# Patient Record
Sex: FEMALE | Race: WHITE | Employment: OTHER | URBAN - METROPOLITAN AREA
[De-identification: names, ages, dates, MRNs, and addresses within clinical notes are randomized per-mention and may not be internally consistent; named-entity substitution may affect disease eponyms.]

---

## 2020-02-25 ENCOUNTER — TRANSFERRED RECORDS (OUTPATIENT)
Dept: HEALTH INFORMATION MANAGEMENT | Facility: CLINIC | Age: 45
End: 2020-02-25

## 2020-03-04 ENCOUNTER — REFERRAL (OUTPATIENT)
Dept: TRANSPLANT | Facility: CLINIC | Age: 45
End: 2020-03-04

## 2020-03-05 NOTE — TELEPHONE ENCOUNTER
"March 5, 2020 3:38 PM -  [id number]:   I spoke with the [facility] who had initially faxed over the referral to transplant in error. I also called the referring clinic, [facility] and spoke with nurse Tracey. She said the intent was to refer patient first to Pulmonology here for a diagnosis/ possible lung biopsy and then as stated in Dr. Cali Simmons's office note which was included, possible referral to transplant if needed depending on diagnosis.  Records have been indexed to chart and also available in CE. Patient has been contacted by the referring provider's  Office (nurse Taylor) to explain this misunderstanding and [facility] pulmonary has contacted the patient regarding scheduling an appt.     March 5, 2020 10:09 AM -  [id number]:   Patient returned my call and when I brought up a lung transplant she informed me that this is the 1st time she has heard of a transplant. She said \" I am in Shock\". She stated she thought she was being referred to Pulmonology for a consult regarding her PH.  I asked her to call Dr. Cali Simmons's office for clarification and to call me back.   "

## 2020-03-06 DIAGNOSIS — J84.89 BOOP (BRONCHIOLITIS OBLITERANS WITH ORGANIZING PNEUMONIA) (H): Primary | ICD-10-CM

## 2020-03-06 NOTE — TELEPHONE ENCOUNTER
RECORDS RECEIVED FROM: Care everywhere    DATE RECEIVED: 3.10.20   NOTES STATUS DETAILS   OFFICE NOTE from referring provider Care Everywhere Per appt note Dr. Cali Simmons's    OFFICE NOTE from other specialist Care Everywhere [facility]- 2.4.20 Jorge   [facility] - 11.26.19 samira     DISCHARGE SUMMARY from hospital N/A    DISCHARGE REPORT from the ER N/A    MEDICATION LIST N/A    IMAGING  (NEED IMAGES AND REPORTS)     CT SCAN Care Everywhere [facility]- 2.4.20, 1/9/18,    CHEST XRAY (CXR) Care Everywhere [facility]- 1.13.18, 1.12.18, 1.11.18, 1/9/18,   (1/4/18- 1/9/18), 1/2/18     TESTS     PULMONARY FUNCTION TESTING (PFT) In process/CE 3.10.20- scheduled   [place]- 11.21.19       Action 3.6.20 sv    Action Taken Imaging request sent to [place] and [facility] for  [place]  PFT- 11.21.19    [facility]-   CXR- 1.13.18, 1.12.18, 1.11.18, 1/9/18,   (1/4/18- 1/9/18), 1/2/18  CT-  2/4/20, 1/9/18    Message sent to nurse to place PFT order         Action 3.9.20 sv    Action Taken Received CD from [facility] of images  received all images requested from [facility]

## 2020-03-10 ENCOUNTER — PRE VISIT (OUTPATIENT)
Dept: PULMONOLOGY | Facility: CLINIC | Age: 45
End: 2020-03-10

## 2020-03-10 ENCOUNTER — OFFICE VISIT (OUTPATIENT)
Dept: PULMONOLOGY | Facility: CLINIC | Age: 45
End: 2020-03-10
Attending: INTERNAL MEDICINE
Payer: COMMERCIAL

## 2020-03-10 VITALS
WEIGHT: 147 LBS | BODY MASS INDEX: 31.71 KG/M2 | OXYGEN SATURATION: 95 % | HEIGHT: 57 IN | RESPIRATION RATE: 18 BRPM | DIASTOLIC BLOOD PRESSURE: 64 MMHG | HEART RATE: 72 BPM | SYSTOLIC BLOOD PRESSURE: 100 MMHG

## 2020-03-10 DIAGNOSIS — J84.89 BOOP (BRONCHIOLITIS OBLITERANS WITH ORGANIZING PNEUMONIA) (H): ICD-10-CM

## 2020-03-10 DIAGNOSIS — R06.02 SHORTNESS OF BREATH: ICD-10-CM

## 2020-03-10 DIAGNOSIS — R06.02 SHORTNESS OF BREATH: Primary | ICD-10-CM

## 2020-03-10 LAB
IGG SERPL-MCNC: 1176 MG/DL (ref 610–1616)
IGG1 SER-MCNC: 754 MG/DL (ref 382–929)
IGG2 SER-MCNC: 400 MG/DL (ref 242–700)
IGG3 SER-MCNC: 58 MG/DL (ref 22–176)
IGG4 SER-MCNC: 19 MG/DL (ref 4–86)

## 2020-03-10 PROCEDURE — 36415 COLL VENOUS BLD VENIPUNCTURE: CPT | Performed by: INTERNAL MEDICINE

## 2020-03-10 PROCEDURE — 82787 IGG 1 2 3 OR 4 EACH: CPT | Performed by: INTERNAL MEDICINE

## 2020-03-10 PROCEDURE — G0463 HOSPITAL OUTPT CLINIC VISIT: HCPCS | Mod: ZF

## 2020-03-10 PROCEDURE — 82784 ASSAY IGA/IGD/IGG/IGM EACH: CPT | Performed by: INTERNAL MEDICINE

## 2020-03-10 RX ORDER — SENNOSIDES A AND B 8.6 MG/1
1 TABLET, FILM COATED ORAL DAILY PRN
COMMUNITY

## 2020-03-10 RX ORDER — METOPROLOL SUCCINATE 50 MG/1
50 TABLET, EXTENDED RELEASE ORAL
COMMUNITY

## 2020-03-10 RX ORDER — METHYLDOPA/HYDROCHLOROTHIAZIDE 250MG-25MG
TABLET ORAL DAILY PRN
COMMUNITY

## 2020-03-10 ASSESSMENT — PAIN SCALES - GENERAL: PAINLEVEL: NO PAIN (0)

## 2020-03-10 ASSESSMENT — MIFFLIN-ST. JEOR: SCORE: 1172.73

## 2020-03-10 NOTE — PROGRESS NOTES
Reason for Visit  Sherita Johnson is a 44 year old year old female who is being seen for Consult (New consult on Sherita and her lung issues)    Pulmonary HPI  45 YO referred for further evaluation of SOB and COPPOLA. States has had cough that has been intermittent since childhood, Spring and Fall were worse. Per outside notes, patient had reported being in hospital frequently during first two years.  Denies any other exacerbants. Was told by Mother that she may have asthma; no evaluation was done at that time.  Since childhood to 2018 had intermittent episodes of cough that required courses of antibiotics.  Denies being on prednisone during that time.  No SOB or COPPOLA. Was seen by PCP NP in 2016 for cough; pulse oximetry was low- hospitalized. Was found that finger pulse ox was low but ear probe was normal.  Started on albuterol, used as needed for cough.  No change in symptoms until January 2018; diagnosed with Influenza A, hospitalized, developed acute respiratory failure requiring intubation for 10 days, septic shock and PABLITO.  Discharged after being in hospital for ~ 2 weeks, not treated with steroids, discharged on 1L/min oxygen for one month.  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  Onset of SOB and COPPOLA in 7-19, no triggers or exposures.  Symptoms have been progressive since that time. COPPOLA after walking 100 feet, unable to walk a flight of stairs.  No change in cough- no increase.  Hospitalized in 10-19 for cough and desaturation, started on oxygen.  Subsequently had CT chest with and without PE protocol- no PE, presence of airtrapping.  Recent sleep study negative but previously had positive study but declined CPAP. ANCA negative, Rheumatologic work-up negative except for increased KAELYN- repeat was normal; plans for outpatient Rheumatology evaluation.  Lifelong non-smoker, + second hand smoke exposure.  No occupational or hobby exposure.  No prior PFT's except for in Fall 2019, none prior.   Had recent right and left heart catherization- left heart cath without significant CAD, RHC with mean pulmonary artery pressure of 38, preserved cardiac output.    The patient was seen and examined by Matt Sanchez MD           Current Outpatient Medications   Medication     albuterol (PROAIR RESPICLICK) 108 (90 Base) MCG/ACT inhaler     Cholecalciferol (VITAMIN D3) 50 MCG (2000 UT) CHEW     Echinacea 125 MG CAPS     Fexofenadine HCl (MUCINEX ALLERGY PO)     fluticasone-vilanterol (BREO ELLIPTA) 100-25 MCG/INH inhaler     Loratadine (CLARITIN PO)     metoprolol succinate ER (TOPROL-XL) 50 MG 24 hr tablet     MULTIPLE VITAMIN PO     senna (SENOKOT) 8.6 MG tablet     sertraline (ZOLOFT) 50 MG tablet     No current facility-administered medications for this visit.      Allergies   Allergen Reactions     Ciprofloxacin Anaphylaxis, Hives, Itching and Rash     Metronidazole Anaphylaxis, Hives, Itching and Rash     Penicillins      Other reaction(s): Headache, Unknown/Not Verified     Sulfa Drugs      No past medical history on file.    No past surgical history on file.    Social History     Socioeconomic History     Marital status: Single     Spouse name: Not on file     Number of children: Not on file     Years of education: Not on file     Highest education level: Not on file   Occupational History     Not on file   Social Needs     Financial resource strain: Not on file     Food insecurity     Worry: Not on file     Inability: Not on file     Transportation needs     Medical: Not on file     Non-medical: Not on file   Tobacco Use     Smoking status: Never Smoker     Smokeless tobacco: Never Used   Substance and Sexual Activity     Alcohol use: Not on file     Drug use: Not on file     Sexual activity: Not on file   Lifestyle     Physical activity     Days per week: Not on file     Minutes per session: Not on file     Stress: Not on file   Relationships     Social connections     Talks on phone: Not on file     Gets  "together: Not on file     Attends [employer] service: Not on file     Active member of club or organization: Not on file     Attends meetings of clubs or organizations: Not on file     Relationship status: Not on file     Intimate partner violence     Fear of current or ex partner: Not on file     Emotionally abused: Not on file     Physically abused: Not on file     Forced sexual activity: Not on file   Other Topics Concern     Not on file   Social History Narrative     Not on file       FH:  Mother-COPD, Brother- asthma, Sister- arrhythmia  ROS Pulmonary  A complete ROS was otherwise negative except as noted in the HPI.  /64   Pulse 72   Resp 18   Ht 1.435 m (4' 8.5\")   Wt 66.7 kg (147 lb)   SpO2 95%   BMI 32.38 kg/m    Exam:   GENERAL APPEARANCE: Well developed, well nourished, alert, and in no apparent distress.  EYES: PERRL, EOMI  HENT: Nasal mucosa with no edema and no hyperemia. No nasal polyps.  EARS: Canals clear, TMs normal  MOUTH: Oral mucosa is moist, without any lesions, no tonsillar enlargement, no oropharyngeal exudate.  NECK: supple, no masses, no thyromegaly.  LYMPHATICS: No significant axillary, cervical, or supraclavicular nodes.  RESP: Decreased air flow throughout.  No crackles. No rhonchi. No wheezes.  CV: Normal S1, S2, regular rhythm, normal rate. No murmur.  No rub. No gallop. No LE edema.   ABDOMEN:  Bowel sounds normal, soft, nontender, no HSM or masses.   MS: extremities normal. No clubbing. No cyanosis.  SKIN: no rash on limited exam  NEURO: Mentation intact, speech normal, normal strength and tone, normal gait and stance  PSYCH: mentation appears normal. and affect normal/bright  Results:  Full pulmonary function tests were personally reviewed in clinic  FVC 1.37 (50%), FEV-1 0.83 (37%), FEV-1/FVC 61%.  No significant change after bronchodilator  %, %, %  DLCO 90%  Moderate airflow limitation.  Overdistended lung volumes with air trapping.  Normal " diffusion capacity.  Recent Results (from the past 168 hour(s))   General PFT Lab (Please always keep checked)    Collection Time: 03/10/20  6:10 AM   Result Value Ref Range    FVC-Pred 2.68 L    FVC-Pre 1.37 L    FVC-%Pred-Pre 50 %    FEV1-Pre 0.83 L    FEV1-%Pred-Pre 37 %    FEV1FVC-Pred 82 %    FEV1FVC-Pre 61 %    FEFMax-Pred 5.77 L/sec    FEFMax-Pre 2.40 L/sec    FEFMax-%Pred-Pre 41 %    FEF2575-Pred 2.45 L/sec    FEF2575-Pre 0.47 L/sec    SCK0966-%Pred-Pre 19 %    FEF2575-Post 0.47 L/sec    CBH0848-%Pred-Post 19 %    ExpTime-Pre 7.78 sec    FIFMax-Pre 1.91 L/sec    VC-Pred 2.61 L    VC-Pre 1.42 L    VC-%Pred-Pre 54 %    IC-Pred 2.09 L    IC-Pre 1.30 L    IC-%Pred-Pre 61 %    ERV-Pred 0.52 L    ERV-Pre 0.12 L    ERV-%Pred-Pre 23 %    FEV1FEV6-Pred 83 %    FEV1FEV6-Pre 61 %    FRCPleth-Pred 2.26 L    FRCPleth-Pre 2.71 L    FRCPleth-%Pred-Pre 120 %    RVPleth-Pred 1.33 L    RVPleth-Pre 2.59 L    RVPleth-%Pred-Pre 194 %    TLCPleth-Pred 3.68 L    TLCPleth-Pre 4.01 L    TLCPleth-%Pred-Pre 108 %    DLCOunc-Pred 16.32 ml/min/mmHg    DLCOunc-Pre 14.72 ml/min/mmHg    DLCOunc-%Pred-Pre 90 %    VA-Pre 2.56 L    VA-%Pred-Pre 72 %    FEV1SVC-Pred 84 %    FEV1SVC-Pre 58 %       Assessment and plan:   43 YO with history of respiratory problems early in life with onset of symptoms since 2018 and particularly since Summer 2019.  Moderate obstructive disease on PFT's without smoking history and normal alpha one antitrypsin level; unclear etiology for obstructive lung disease.  Also with newly diagnosed pulmonary hypertension.  Requires low level oxygen support and past history of obstructive sleep apnea.  Cause of obstructive lung disease is not clear- unlikely related to influenza, ? Airway centric fibrosis.  May need lung biopsy to definitively identify cause of airflow obstruction     1. Will review outside records  2. Await images from outside CT  3. Check IgG and subclasses; particularly IgG4 levels  4. Further discussions  regarding management are dependent upon review of the above studies.    Patient to call with any questions or concerns.

## 2020-03-10 NOTE — NURSING NOTE
Chief Complaint   Patient presents with     Consult     New consult on Sherita and her lung issues     Paul Paulson, VIRGEN CMA at 6:56 AM on 3/10/2020

## 2020-03-10 NOTE — LETTER
3/10/2020       RE: Sherita Johnson  [address]  [address]  [place] [address]     Dear Colleague,    Thank you for referring your patient, Sherita Johnson, to the [facility] at [facility]. Please see a copy of my visit note below.    Reason for Visit  Sherita Johnson is a 44 year old year old female who is being seen for Consult (New consult on Sherita and her lung issues)    Pulmonary HPI  43 YO referred for further evaluation of SOB and COPPOLA. States has had cough that has been intermittent since childhood, Spring and Fall were worse. Per outside notes, patient had reported being in hospital frequently during first two years.  Denies any other exacerbants. Was told by Mother that she may have asthma; no evaluation was done at that time.  Since childhood to 2018 had intermittent episodes of cough that required courses of antibiotics.  Denies being on prednisone during that time.  No SOB or COPPOLA. Was seen by PCP NP in 2016 for cough; pulse oximetry was low- hospitalized. Was found that finger pulse ox was low but ear probe was normal.  Started on albuterol, used as needed for cough.  No change in symptoms until January 2018; diagnosed with Influenza A, hospitalized, developed acute respiratory failure requiring intubation for 10 days, septic shock and PABLITO.  Discharged after being in hospital for ~ 2 weeks, not treated with steroids, discharged on 1L/min oxygen for one month.  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  Onset of SOB and COPPOLA in 7-19, no triggers or exposures.  Symptoms have been progressive since that time. COPPOLA after walking 100 feet, unable to walk a flight of stairs.  No change in cough- no increase.  Hospitalized in 10-19 for cough and desaturation, started on oxygen.  Subsequently had CT chest with and without PE protocol- no PE, presence of airtrapping.  Recent sleep study negative but previously had positive  study but declined CPAP. ANCA negative, Rheumatologic work-up negative except for increased KAELYN- repeat was normal; plans for outpatient Rheumatology evaluation.  Lifelong non-smoker, + second hand smoke exposure.  No occupational or hobby exposure.  No prior PFT's except for in Fall 2019, none prior.  Had recent right and left heart catherization- left heart cath without significant CAD, RHC with mean pulmonary artery pressure of 38, preserved cardiac output.    The patient was seen and examined by Matt Sanchez MD           Current Outpatient Medications   Medication     albuterol (PROAIR RESPICLICK) 108 (90 Base) MCG/ACT inhaler     Cholecalciferol (VITAMIN D3) 50 MCG (2000 UT) CHEW     Echinacea 125 MG CAPS     Fexofenadine HCl (MUCINEX ALLERGY PO)     fluticasone-vilanterol (BREO ELLIPTA) 100-25 MCG/INH inhaler     Loratadine (CLARITIN PO)     metoprolol succinate ER (TOPROL-XL) 50 MG 24 hr tablet     MULTIPLE VITAMIN PO     senna (SENOKOT) 8.6 MG tablet     sertraline (ZOLOFT) 50 MG tablet     No current facility-administered medications for this visit.      Allergies   Allergen Reactions     Ciprofloxacin Anaphylaxis, Hives, Itching and Rash     Metronidazole Anaphylaxis, Hives, Itching and Rash     Penicillins      Other reaction(s): Headache, Unknown/Not Verified     Sulfa Drugs      No past medical history on file.    No past surgical history on file.    Social History     Socioeconomic History     Marital status: Single     Spouse name: Not on file     Number of children: Not on file     Years of education: Not on file     Highest education level: Not on file   Occupational History     Not on file   Social Needs     Financial resource strain: Not on file     Food insecurity     Worry: Not on file     Inability: Not on file     Transportation needs     Medical: Not on file     Non-medical: Not on file   Tobacco Use     Smoking status: Never Smoker     Smokeless tobacco: Never Used   Substance and  "Sexual Activity     Alcohol use: Not on file     Drug use: Not on file     Sexual activity: Not on file   Lifestyle     Physical activity     Days per week: Not on file     Minutes per session: Not on file     Stress: Not on file   Relationships     Social connections     Talks on phone: Not on file     Gets together: Not on file     Attends [employer] service: Not on file     Active member of club or organization: Not on file     Attends meetings of clubs or organizations: Not on file     Relationship status: Not on file     Intimate partner violence     Fear of current or ex partner: Not on file     Emotionally abused: Not on file     Physically abused: Not on file     Forced sexual activity: Not on file   Other Topics Concern     Not on file   Social History Narrative     Not on file       FH:  Mother-COPD, Brother- asthma, Sister- arrhythmia  ROS Pulmonary  A complete ROS was otherwise negative except as noted in the HPI.  /64   Pulse 72   Resp 18   Ht 1.435 m (4' 8.5\")   Wt 66.7 kg (147 lb)   SpO2 95%   BMI 32.38 kg/m    Exam:   GENERAL APPEARANCE: Well developed, well nourished, alert, and in no apparent distress.  EYES: PERRL, EOMI  HENT: Nasal mucosa with no edema and no hyperemia. No nasal polyps.  EARS: Canals clear, TMs normal  MOUTH: Oral mucosa is moist, without any lesions, no tonsillar enlargement, no oropharyngeal exudate.  NECK: supple, no masses, no thyromegaly.  LYMPHATICS: No significant axillary, cervical, or supraclavicular nodes.  RESP: Decreased air flow throughout.  No crackles. No rhonchi. No wheezes.  CV: Normal S1, S2, regular rhythm, normal rate. No murmur.  No rub. No gallop. No LE edema.   ABDOMEN:  Bowel sounds normal, soft, nontender, no HSM or masses.   MS: extremities normal. No clubbing. No cyanosis.  SKIN: no rash on limited exam  NEURO: Mentation intact, speech normal, normal strength and tone, normal gait and stance  PSYCH: mentation appears normal. and affect " normal/bright  Results:  Full pulmonary function tests were personally reviewed in clinic  FVC 1.37 (50%), FEV-1 0.83 (37%), FEV-1/FVC 61%.  No significant change after bronchodilator  %, %, %  DLCO 90%  Moderate airflow limitation.  Overdistended lung volumes with air trapping.  Normal diffusion capacity.  Recent Results (from the past 168 hour(s))   General PFT Lab (Please always keep checked)    Collection Time: 03/10/20  6:10 AM   Result Value Ref Range    FVC-Pred 2.68 L    FVC-Pre 1.37 L    FVC-%Pred-Pre 50 %    FEV1-Pre 0.83 L    FEV1-%Pred-Pre 37 %    FEV1FVC-Pred 82 %    FEV1FVC-Pre 61 %    FEFMax-Pred 5.77 L/sec    FEFMax-Pre 2.40 L/sec    FEFMax-%Pred-Pre 41 %    FEF2575-Pred 2.45 L/sec    FEF2575-Pre 0.47 L/sec    RXH0032-%Pred-Pre 19 %    FEF2575-Post 0.47 L/sec    TOJ8870-%Pred-Post 19 %    ExpTime-Pre 7.78 sec    FIFMax-Pre 1.91 L/sec    VC-Pred 2.61 L    VC-Pre 1.42 L    VC-%Pred-Pre 54 %    IC-Pred 2.09 L    IC-Pre 1.30 L    IC-%Pred-Pre 61 %    ERV-Pred 0.52 L    ERV-Pre 0.12 L    ERV-%Pred-Pre 23 %    FEV1FEV6-Pred 83 %    FEV1FEV6-Pre 61 %    FRCPleth-Pred 2.26 L    FRCPleth-Pre 2.71 L    FRCPleth-%Pred-Pre 120 %    RVPleth-Pred 1.33 L    RVPleth-Pre 2.59 L    RVPleth-%Pred-Pre 194 %    TLCPleth-Pred 3.68 L    TLCPleth-Pre 4.01 L    TLCPleth-%Pred-Pre 108 %    DLCOunc-Pred 16.32 ml/min/mmHg    DLCOunc-Pre 14.72 ml/min/mmHg    DLCOunc-%Pred-Pre 90 %    VA-Pre 2.56 L    VA-%Pred-Pre 72 %    FEV1SVC-Pred 84 %    FEV1SVC-Pre 58 %       Assessment and plan:   43 YO with history of respiratory problems early in life with onset of symptoms since 2018 and particularly since Summer 2019.  Moderate obstructive disease on PFT's without smoking history and normal alpha one antitrypsin level; unclear etiology for obstructive lung disease.  Also with newly diagnosed pulmonary hypertension.  Requires low level oxygen support and past history of obstructive sleep apnea.  Cause of obstructive  lung disease is not clear- unlikely related to influenza, ? Airway centric fibrosis.  May need lung biopsy to definitively identify cause of airflow obstruction     1. Will review outside records  2. Await images from outside CT  3. Check IgG and subclasses; particularly IgG4 levels  4. Further discussions regarding management are dependent upon review of the above studies.    Patient to call with any questions or concerns.          Again, thank you for allowing me to participate in the care of your patient.      Sincerely,    Matt Sanchez MD

## 2020-03-12 ENCOUNTER — HEALTH MAINTENANCE LETTER (OUTPATIENT)
Age: 45
End: 2020-03-12

## 2020-03-13 LAB
DLCOUNC-%PRED-PRE: 90 %
DLCOUNC-PRE: 14.72 ML/MIN/MMHG
DLCOUNC-PRED: 16.32 ML/MIN/MMHG
ERV-%PRED-PRE: 23 %
ERV-PRE: 0.12 L
ERV-PRED: 0.52 L
EXPTIME-PRE: 7.78 SEC
FEF2575-%PRED-POST: 19 %
FEF2575-%PRED-PRE: 19 %
FEF2575-POST: 0.47 L/SEC
FEF2575-PRE: 0.47 L/SEC
FEF2575-PRED: 2.45 L/SEC
FEFMAX-%PRED-PRE: 41 %
FEFMAX-PRE: 2.4 L/SEC
FEFMAX-PRED: 5.77 L/SEC
FEV1-%PRED-PRE: 37 %
FEV1-PRE: 0.83 L
FEV1FEV6-PRE: 61 %
FEV1FEV6-PRED: 83 %
FEV1FVC-PRE: 61 %
FEV1FVC-PRED: 82 %
FEV1SVC-PRE: 58 %
FEV1SVC-PRED: 84 %
FIFMAX-PRE: 1.91 L/SEC
FRCPLETH-%PRED-PRE: 120 %
FRCPLETH-PRE: 2.71 L
FRCPLETH-PRED: 2.26 L
FVC-%PRED-PRE: 50 %
FVC-PRE: 1.37 L
FVC-PRED: 2.68 L
IC-%PRED-PRE: 61 %
IC-PRE: 1.3 L
IC-PRED: 2.09 L
RVPLETH-%PRED-PRE: 194 %
RVPLETH-PRE: 2.59 L
RVPLETH-PRED: 1.33 L
TLCPLETH-%PRED-PRE: 108 %
TLCPLETH-PRE: 4.01 L
TLCPLETH-PRED: 3.68 L
VA-%PRED-PRE: 72 %
VA-PRE: 2.56 L
VC-%PRED-PRE: 54 %
VC-PRE: 1.42 L
VC-PRED: 2.61 L

## 2020-03-18 ENCOUNTER — TELEPHONE (OUTPATIENT)
Dept: PULMONOLOGY | Facility: CLINIC | Age: 45
End: 2020-03-18

## 2020-03-18 NOTE — TELEPHONE ENCOUNTER
"Contacted patient to advise Dr. Sanchez's message:    \" I have reviewed the outside records but I am not able to see the outside CT in [facility].  As I and her outside provider have discussed with her she may need a lung biopsy.  However with the hold on non-emergent surgical procedures we will need to further discuss after concerns regarding the pandemic resolve.  I will present her case at the ILD conference when able\".    Pt verbalized understanding and has no further questions at this time.   "

## 2020-03-26 NOTE — TELEPHONE ENCOUNTER
March 26, 2020 4:23 PM -  [id number]:   This referral was sent to transplant intake in error. Ok to close

## 2021-01-04 ENCOUNTER — HEALTH MAINTENANCE LETTER (OUTPATIENT)
Age: 46
End: 2021-01-04

## 2021-03-07 ENCOUNTER — HEALTH MAINTENANCE LETTER (OUTPATIENT)
Age: 46
End: 2021-03-07

## 2021-03-24 ENCOUNTER — TELEPHONE (OUTPATIENT)
Dept: PULMONOLOGY | Facility: CLINIC | Age: 46
End: 2021-03-24

## 2021-03-24 NOTE — TELEPHONE ENCOUNTER
Contacted by patient to state that she was to return for F/U apt with DR Sanchez. She would like to schedule virtual apt with Dr Sanchez. The clinic has yet to receive ct on CD that was requested. Dr Sanchez was to review and have ILD conference discuss. Left message with her clinic in [facility]() to see if they could push image to [facility]. If not will need image mailed to clinic. Left clinic number to respond to message.    Images pushed to [facility] and in system. Sent message to Dr Sanchez to review images and then could schedule virtual visit. Let patient know images here.

## 2021-04-25 ENCOUNTER — HEALTH MAINTENANCE LETTER (OUTPATIENT)
Age: 46
End: 2021-04-25

## 2021-07-06 ENCOUNTER — TELEPHONE (OUTPATIENT)
Dept: PULMONOLOGY | Facility: CLINIC | Age: 46
End: 2021-07-06

## 2021-07-06 NOTE — TELEPHONE ENCOUNTER
Patient called to get appointment scheduled. RN sent message to provider to see when he would like to see her again, as it is not specified in the progress note. RN will await response and send to Clinic Coordinators when there is an answer.

## 2021-07-07 ENCOUNTER — TELEPHONE (OUTPATIENT)
Dept: PULMONOLOGY | Facility: CLINIC | Age: 46
End: 2021-07-07

## 2021-07-12 ENCOUNTER — TELEPHONE (OUTPATIENT)
Dept: PULMONOLOGY | Facility: CLINIC | Age: 46
End: 2021-07-12

## 2021-07-12 NOTE — TELEPHONE ENCOUNTER
Pt called and LVM over the weekend with questions regarding her visit in August with Dr. Sanchez. Touched base with ANGIE Trinh, and spoke with pt as pt and cousin were wondering if pt would need a multi-day stay in August. Informed her this was more to touch base as we have not seen her in over a year. Appt details confirmed.

## 2021-08-17 ENCOUNTER — OFFICE VISIT (OUTPATIENT)
Dept: PULMONOLOGY | Facility: CLINIC | Age: 46
End: 2021-08-17
Attending: INTERNAL MEDICINE
Payer: COMMERCIAL

## 2021-08-17 VITALS — OXYGEN SATURATION: 99 % | SYSTOLIC BLOOD PRESSURE: 99 MMHG | HEART RATE: 67 BPM | DIASTOLIC BLOOD PRESSURE: 67 MMHG

## 2021-08-17 DIAGNOSIS — J41.0 SIMPLE CHRONIC BRONCHITIS (H): Primary | ICD-10-CM

## 2021-08-17 DIAGNOSIS — J41.0 SIMPLE CHRONIC BRONCHITIS (H): ICD-10-CM

## 2021-08-17 LAB
6 MIN WALK (FT): 670 FT
6 MIN WALK (M): 204 M

## 2021-08-17 PROCEDURE — 94618 PULMONARY STRESS TESTING: CPT

## 2021-08-17 PROCEDURE — 99213 OFFICE O/P EST LOW 20 MIN: CPT | Mod: 25 | Performed by: INTERNAL MEDICINE

## 2021-08-17 PROCEDURE — G0463 HOSPITAL OUTPT CLINIC VISIT: HCPCS

## 2021-08-17 RX ORDER — AMLODIPINE BESYLATE 5 MG/1
5 TABLET ORAL DAILY
COMMUNITY

## 2021-08-17 NOTE — PROGRESS NOTES
Reason for Visit  Sherita Johnson is a 46 year old year old female who is being seen for General Visit (follow up)    Pulmonary HPI  47 YO referred for further evaluation of SOB and COPPOLA. States has had cough that has been intermittent since childhood, Spring and Fall were worse. Per outside notes, patient had reported being in hospital frequently during first two years.  Denies any other exacerbants. Was told by Mother that she may have asthma; no evaluation was done at that time.  Since childhood to 2018 had intermittent episodes of cough that required courses of antibiotics.  Denies being on prednisone during that time.  No SOB or COPPOLA. Was seen by PCP NP in 2016 for cough; pulse oximetry was low- hospitalized. Was found that finger pulse ox was low but ear probe was normal.  Started on albuterol, used as needed for cough.  No change in symptoms until January 2018; diagnosed with Influenza A, hospitalized, developed acute respiratory failure requiring intubation for 10 days, septic shock and PABLITO.  Discharged after being in hospital for ~ 2 weeks, not treated with steroids, discharged on 1L/min oxygen for one month.  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  Onset of SOB and COPPOLA in 7-19, no triggers or exposures.  Symptoms have been progressive since that time. COPPOLA after walking 100 feet, unable to walk a flight of stairs.  No change in cough- no increase.  Hospitalized in 10-19 for cough and desaturation, started on oxygen.  Subsequently had CT chest with and without PE protocol- no PE, presence of airtrapping.  Recent sleep study negative but previously had positive study but declined CPAP. ANCA negative, Rheumatologic work-up negative except for increased KAELYN- repeat was normal; plans for outpatient Rheumatology evaluation.  Lifelong non-smoker, + second hand smoke exposure.  No occupational or hobby exposure.  No prior PFT's except for in Fall 2019, none prior.  Had recent right and left  heart catherization- left heart cath without significant CAD, RHC with mean pulmonary artery pressure of 38, preserved cardiac output.    Last seen 18 months ago. At her last visit, the IgG level and sub-types were measured- all levels were normal including IgG4.  Since her last visit she has been doing about the same; remains on 1L, still has SOB/COPPOLA after walking 0.5 block.  Is not involved in PT.  Was changed from Breo to Advair, thinks Breo was better.  Has not received COVID vaccination, is unsure if she wants to receive the vaccination.  She brought down a  CD of outside images for review.    The patient was seen and examined by Matt Sanchez MD           Current Outpatient Medications   Medication     albuterol (PROAIR RESPICLICK) 108 (90 Base) MCG/ACT inhaler     Cholecalciferol (VITAMIN D3) 50 MCG (2000 UT) CHEW     Echinacea 125 MG CAPS     Fexofenadine HCl (MUCINEX ALLERGY PO)     fluticasone-vilanterol (BREO ELLIPTA) 100-25 MCG/INH inhaler     Loratadine (CLARITIN PO)     metoprolol succinate ER (TOPROL-XL) 50 MG 24 hr tablet     MULTIPLE VITAMIN PO     senna (SENOKOT) 8.6 MG tablet     sertraline (ZOLOFT) 50 MG tablet     No current facility-administered medications for this visit.     Allergies   Allergen Reactions     Ciprofloxacin Anaphylaxis, Hives, Itching and Rash     Metronidazole Anaphylaxis, Hives, Itching and Rash     Penicillins      Other reaction(s): Headache, Unknown/Not Verified     Sulfa Drugs      No past medical history on file.    No past surgical history on file.    Social History     Socioeconomic History     Marital status: Single     Spouse name: Not on file     Number of children: Not on file     Years of education: Not on file     Highest education level: Not on file   Occupational History     Not on file   Tobacco Use     Smoking status: Never Smoker     Smokeless tobacco: Never Used   Substance and Sexual Activity     Alcohol use: Not on file     Drug use: Not on file      Sexual activity: Not on file   Other Topics Concern     Not on file   Social History Narrative     Not on file     Social Determinants of Health     Financial Resource Strain:      Difficulty of Paying Living Expenses:    Food Insecurity:      Worried About Running Out of Food in the Last Year:      Ran Out of Food in the Last Year:    Transportation Needs:      Lack of Transportation (Medical):      Lack of Transportation (Non-Medical):    Physical Activity:      Days of Exercise per Week:      Minutes of Exercise per Session:    Stress:      Feeling of Stress :    Social Connections:      Frequency of Communication with Friends and Family:      Frequency of Social Gatherings with Friends and Family:      Attends Orthodoxy Services:      Active Member of Clubs or Organizations:      Attends Club or Organization Meetings:      Marital Status:    Intimate Partner Violence:      Fear of Current or Ex-Partner:      Emotionally Abused:      Physically Abused:      Sexually Abused:        ROS Pulmonary  A complete ROS was otherwise negative except as noted in the HPI.  There were no vitals taken for this visit.  Exam:   GENERAL APPEARANCE: Well developed, well nourished, alert, and in no apparent distress.  EYES: PERRL, EOMI  HENT: Nasal mucosa with no edema and no hyperemia. No nasal polyps.  EARS: Canals clear, TMs normal  MOUTH: Oral mucosa is moist, without any lesions, no tonsillar enlargement, no oropharyngeal exudate.  NECK: supple, no masses, no thyromegaly.  LYMPHATICS: No significant axillary, cervical, or supraclavicular nodes.  RESP:  Decreased air flow throughout.  No crackles. No rhonchi. No wheezes.  CV: Normal S1, S2, regular rhythm, normal rate. No murmur.  No rub. No gallop. No LE edema.   ABDOMEN:  Bowel sounds normal, soft, nontender, no HSM or masses.   MS: extremities normal. No clubbing. No cyanosis.  SKIN: no rash on limited exam  NEURO: Mentation intact, speech normal, normal strength and tone,  normal gait and stance  PSYCH: mentation appears normal. and affect normal/bright  Results:  No results found for this or any previous visit (from the past 168 hour(s)).    Assessment and plan:   47 YO with history of respiratory problems early in life with onset of symptoms since 2018 and particularly since Summer 2019.  Moderate obstructive disease on PFT's without smoking history and normal alpha one antitrypsin level; unclear etiology for obstructive lung disease.  Symptoms are at baseline.  Likely due to childhood illness with worsening after severe Influenza.  Also with newly diagnosed pulmonary hypertension.  Requires low level oxygen support and past history of obstructive sleep apnea.     1. Oxygen titration study today to start on 1L NC; assess how much oxygen she requires with activity  2.  Recommend pulmonary rehabilitation  3.  I have requested that outside images be uploaded for review  4. No indications for evaluation for lung transplant at this time    RTC prn

## 2021-08-17 NOTE — LETTER
8/17/2021         RE: Sherita Johnson  [address]  [place] [address]        Dear Colleague,    Thank you for referring your patient, Sherita Johnson, to the [facility] LUNG SCIENCE AND [facility] [place]. Please see a copy of my visit note below.    Reason for Visit  Sherita Johnson is a 46 year old year old female who is being seen for General Visit (follow up)    Pulmonary HPI  45 YO referred for further evaluation of SOB and COPPOLA. States has had cough that has been intermittent since childhood, Spring and Fall were worse. Per outside notes, patient had reported being in hospital frequently during first two years.  Denies any other exacerbants. Was told by Mother that she may have asthma; no evaluation was done at that time.  Since childhood to 2018 had intermittent episodes of cough that required courses of antibiotics.  Denies being on prednisone during that time.  No SOB or COPPOLA. Was seen by PCP NP in 2016 for cough; pulse oximetry was low- hospitalized. Was found that finger pulse ox was low but ear probe was normal.  Started on albuterol, used as needed for cough.  No change in symptoms until January 2018; diagnosed with Influenza A, hospitalized, developed acute respiratory failure requiring intubation for 10 days, septic shock and PABLITO.  Discharged after being in hospital for ~ 2 weeks, not treated with steroids, discharged on 1L/min oxygen for one month.  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  Onset of SOB and COPPOLA in 7-19, no triggers or exposures.  Symptoms have been progressive since that time. COPPOLA after walking 100 feet, unable to walk a flight of stairs.  No change in cough- no increase.  Hospitalized in 10-19 for cough and desaturation, started on oxygen.  Subsequently had CT chest with and without PE protocol- no PE, presence of airtrapping.  Recent sleep study negative but previously had positive study but declined CPAP. ANCA negative,  Rheumatologic work-up negative except for increased KAELYN- repeat was normal; plans for outpatient Rheumatology evaluation.  Lifelong non-smoker, + second hand smoke exposure.  No occupational or hobby exposure.  No prior PFT's except for in Fall 2019, none prior.  Had recent right and left heart catherization- left heart cath without significant CAD, RHC with mean pulmonary artery pressure of 38, preserved cardiac output.    Last seen 18 months ago. At her last visit, the IgG level and sub-types were measured- all levels were normal including IgG4.  Since her last visit she has been doing about the same; remains on 1L, still has SOB/COPPOLA after walking 0.5 block.  Is not involved in PT.  Was changed from Breo to Advair, thinks Breo was better.  Has not received COVID vaccination, is unsure if she wants to receive the vaccination.  She brought down a  CD of outside images for review.    The patient was seen and examined by Matt Sanchez MD           Current Outpatient Medications   Medication     albuterol (PROAIR RESPICLICK) 108 (90 Base) MCG/ACT inhaler     Cholecalciferol (VITAMIN D3) 50 MCG (2000 UT) CHEW     Echinacea 125 MG CAPS     Fexofenadine HCl (MUCINEX ALLERGY PO)     fluticasone-vilanterol (BREO ELLIPTA) 100-25 MCG/INH inhaler     Loratadine (CLARITIN PO)     metoprolol succinate ER (TOPROL-XL) 50 MG 24 hr tablet     MULTIPLE VITAMIN PO     senna (SENOKOT) 8.6 MG tablet     sertraline (ZOLOFT) 50 MG tablet     No current facility-administered medications for this visit.     Allergies   Allergen Reactions     Ciprofloxacin Anaphylaxis, Hives, Itching and Rash     Metronidazole Anaphylaxis, Hives, Itching and Rash     Penicillins      Other reaction(s): Headache, Unknown/Not Verified     Sulfa Drugs      No past medical history on file.    No past surgical history on file.    Social History     Socioeconomic History     Marital status: Single     Spouse name: Not on file     Number of children: Not on  file     Years of education: Not on file     Highest education level: Not on file   Occupational History     Not on file   Tobacco Use     Smoking status: Never Smoker     Smokeless tobacco: Never Used   Substance and Sexual Activity     Alcohol use: Not on file     Drug use: Not on file     Sexual activity: Not on file   Other Topics Concern     Not on file   Social History Narrative     Not on file     Social Determinants of Health     Financial Resource Strain:      Difficulty of Paying Living Expenses:    Food Insecurity:      Worried About Running Out of Food in the Last Year:      Ran Out of Food in the Last Year:    Transportation Needs:      Lack of Transportation (Medical):      Lack of Transportation (Non-Medical):    Physical Activity:      Days of Exercise per Week:      Minutes of Exercise per Session:    Stress:      Feeling of Stress :    Social Connections:      Frequency of Communication with Friends and Family:      Frequency of Social Gatherings with Friends and Family:      Attends Congregational Services:      Active Member of Clubs or Organizations:      Attends Club or Organization Meetings:      Marital Status:    Intimate Partner Violence:      Fear of Current or Ex-Partner:      Emotionally Abused:      Physically Abused:      Sexually Abused:        ROS Pulmonary  A complete ROS was otherwise negative except as noted in the HPI.  There were no vitals taken for this visit.  Exam:   GENERAL APPEARANCE: Well developed, well nourished, alert, and in no apparent distress.  EYES: PERRL, EOMI  HENT: Nasal mucosa with no edema and no hyperemia. No nasal polyps.  EARS: Canals clear, TMs normal  MOUTH: Oral mucosa is moist, without any lesions, no tonsillar enlargement, no oropharyngeal exudate.  NECK: supple, no masses, no thyromegaly.  LYMPHATICS: No significant axillary, cervical, or supraclavicular nodes.  RESP:  Decreased air flow throughout.  No crackles. No rhonchi. No wheezes.  CV: Normal S1, S2,  regular rhythm, normal rate. No murmur.  No rub. No gallop. No LE edema.   ABDOMEN:  Bowel sounds normal, soft, nontender, no HSM or masses.   MS: extremities normal. No clubbing. No cyanosis.  SKIN: no rash on limited exam  NEURO: Mentation intact, speech normal, normal strength and tone, normal gait and stance  PSYCH: mentation appears normal. and affect normal/bright  Results:  No results found for this or any previous visit (from the past 168 hour(s)).    Assessment and plan:   47 YO with history of respiratory problems early in life with onset of symptoms since 2018 and particularly since Summer 2019.  Moderate obstructive disease on PFT's without smoking history and normal alpha one antitrypsin level; unclear etiology for obstructive lung disease.  Symptoms are at baseline.  Likely due to childhood illness with worsening after severe Influenza.  Also with newly diagnosed pulmonary hypertension.  Requires low level oxygen support and past history of obstructive sleep apnea.     1. Oxygen titration study today to start on 1L NC; assess how much oxygen she requires with activity  2.  Recommend pulmonary rehabilitation  3.  I have requested that outside images be uploaded for review  4. No indications for evaluation for lung transplant at this time    RTC prn               Again, thank you for allowing me to participate in the care of your patient.        Sincerely,        Matt Sanchez MD

## 2021-08-17 NOTE — NURSING NOTE
Chief Complaint   Patient presents with     General Visit     follow up     Vitals were taken and medications were reconciled.     MELVI Moses

## 2021-10-10 ENCOUNTER — HEALTH MAINTENANCE LETTER (OUTPATIENT)
Age: 46
End: 2021-10-10

## 2021-10-13 ENCOUNTER — TELEPHONE (OUTPATIENT)
Dept: PULMONOLOGY | Facility: CLINIC | Age: 46
End: 2021-10-13

## 2021-10-13 NOTE — TELEPHONE ENCOUNTER
Rosie called and left message from [facility] program in [phone]. She is requesting records. Left 2 messages to call medical records or fax them(left both numbers) and request records for Dr Matt Sanchez as he was the pulmonologist that saw patient in this clinic.

## 2021-10-13 NOTE — TELEPHONE ENCOUNTER
Spoke with patient as she is ready to start pulmonary rehab. She will leave message with pulmonary rehab facility and their phone number on clinic number. Will fax rehab orders to facility when have that information. Requested she ask PCP for physical therapy orders for her legs. She was inquiring for after visit summary and had that along with provider notes in her possession.

## 2021-10-21 ENCOUNTER — TELEPHONE (OUTPATIENT)
Dept: PULMONOLOGY | Facility: CLINIC | Age: 46
End: 2021-10-21

## 2022-02-25 ENCOUNTER — TELEPHONE (OUTPATIENT)
Dept: PULMONOLOGY | Facility: CLINIC | Age: 47
End: 2022-02-25
Payer: COMMERCIAL

## 2022-03-27 ENCOUNTER — HEALTH MAINTENANCE LETTER (OUTPATIENT)
Age: 47
End: 2022-03-27

## 2022-04-07 ENCOUNTER — TELEPHONE (OUTPATIENT)
Dept: PULMONOLOGY | Facility: CLINIC | Age: 47
End: 2022-04-07
Payer: COMMERCIAL

## 2022-04-07 NOTE — TELEPHONE ENCOUNTER
Patient left message that she needs billing number as her bill went to collections. This writer had given her the billing number on  2/25/22 in message . Gave her the number again. She states she had talked to Nicole Flynn.

## 2022-05-22 ENCOUNTER — HEALTH MAINTENANCE LETTER (OUTPATIENT)
Age: 47
End: 2022-05-22

## 2022-06-15 ENCOUNTER — ANCILLARY PROCEDURE (OUTPATIENT)
Dept: RADIOLOGY | Facility: HOSPITAL | Age: 47
End: 2022-06-15
Payer: MEDICARE

## 2022-06-16 ENCOUNTER — ANCILLARY PROCEDURE (OUTPATIENT)
Dept: ULTRASOUND IMAGING | Facility: HOSPITAL | Age: 47
DRG: 190 | End: 2022-06-16
Payer: MEDICARE

## 2022-06-16 ENCOUNTER — HOSPITAL ENCOUNTER (INPATIENT)
Facility: HOSPITAL | Age: 47
LOS: 5 days | Discharge: HOME | DRG: 190 | End: 2022-06-21
Attending: INTERNAL MEDICINE | Admitting: INTERNAL MEDICINE
Payer: MEDICARE

## 2022-06-16 ENCOUNTER — APPOINTMENT (OUTPATIENT)
Dept: RADIOLOGY | Facility: HOSPITAL | Age: 47
DRG: 190 | End: 2022-06-16
Payer: MEDICARE

## 2022-06-16 ENCOUNTER — APPOINTMENT (OUTPATIENT)
Dept: CARDIOLOGY | Facility: HOSPITAL | Age: 47
DRG: 190 | End: 2022-06-16
Payer: MEDICARE

## 2022-06-16 ENCOUNTER — APPOINTMENT (OUTPATIENT)
Dept: CT IMAGING | Facility: HOSPITAL | Age: 47
DRG: 190 | End: 2022-06-16
Payer: MEDICARE

## 2022-06-16 DIAGNOSIS — F41.9 ANXIETY: ICD-10-CM

## 2022-06-16 DIAGNOSIS — J96.90 RESPIRATORY FAILURE (CMS/HCC): Primary | ICD-10-CM

## 2022-06-16 DIAGNOSIS — I10 HYPERTENSION, ESSENTIAL: Chronic | ICD-10-CM

## 2022-06-16 DIAGNOSIS — I50.9 CONGESTIVE HEART FAILURE (CHF) (CMS/HCC): ICD-10-CM

## 2022-06-16 DIAGNOSIS — J44.1 CHRONIC OBSTRUCTIVE PULMONARY DISEASE WITH ACUTE EXACERBATION (CMS/HCC): ICD-10-CM

## 2022-06-16 DIAGNOSIS — J44.9 COPD (CHRONIC OBSTRUCTIVE PULMONARY DISEASE) (CMS/HCC): ICD-10-CM

## 2022-06-16 PROBLEM — N17.9 ACUTE KIDNEY INJURY (CMS/HCC): Status: ACTIVE | Noted: 2022-06-16

## 2022-06-16 PROBLEM — J96.01 ACUTE RESPIRATORY FAILURE WITH HYPOXIA AND HYPERCAPNIA (CMS/HCC): Status: ACTIVE | Noted: 2022-06-16

## 2022-06-16 PROBLEM — J96.02 ACUTE RESPIRATORY FAILURE WITH HYPOXIA AND HYPERCAPNIA (CMS/HCC): Status: ACTIVE | Noted: 2022-06-16

## 2022-06-16 LAB
ABO GROUP (TYPE) IN BLOOD: NORMAL
ALBUMIN SERPL-MCNC: 4.7 G/DL (ref 3.5–5.3)
ALP SERPL-CCNC: 102 U/L (ref 39–100)
ALT SERPL-CCNC: 29 U/L (ref 7–52)
ANION GAP SERPL CALC-SCNC: 8 MMOL/L (ref 3–11)
ANTIBODY SCREEN: NORMAL
ASCENDING AORTA: 2.28 CM
AST SERPL-CCNC: 19 U/L
AV LVOT PEAK GRADIENT: 10.8 MMHG
AV MEAN GRADIENT: 13.17 MMHG
AV PEAK GRADIENT: 22.85 MMHG
B PARAP IS1001 DNA NPH QL NAA+NON-PROBE: NEGATIVE
B PERT.PT PRMT NPH QL NAA+NON-PROBE: NEGATIVE
BASE EXCESS BLDA CALC-SCNC: -2.6 MMOL/L (ref -2–2)
BASE EXCESS BLDA CALC-SCNC: 11.6 MMOL/L (ref -2–2)
BASE EXCESS BLDA CALC-SCNC: 2.1 MMOL/L (ref -2–2)
BASE EXCESS BLDA CALC-SCNC: 5.3 MMOL/L (ref -2–2)
BASOPHILS # BLD AUTO: 0 10*3/UL
BASOPHILS NFR BLD AUTO: 0 % (ref 0–2)
BILIRUB SERPL-MCNC: 0.52 MG/DL (ref 0.2–1.4)
BNP SERPL-MCNC: 1670 PG/ML (ref 0–100)
BUN SERPL-MCNC: 43 MG/DL (ref 7–25)
C PNEUM DNA NPH QL NAA+NON-PROBE: NEGATIVE
CALCIUM ALBUM COR SERPL-MCNC: 10.3 MG/DL (ref 8.6–10.3)
CALCIUM SERPL-MCNC: 10.9 MG/DL (ref 8.6–10.3)
CHLORIDE SERPL-SCNC: 102 MMOL/L (ref 98–107)
CO2 BLDA-SCNC: 20.4 MMOL/L (ref 19–24)
CO2 BLDA-SCNC: 27.4 MMOL/L (ref 19–24)
CO2 BLDA-SCNC: 27.5 MMOL/L (ref 19–24)
CO2 BLDA-SCNC: 30.1 MMOL/L (ref 19–24)
CO2 SERPL-SCNC: 31 MMOL/L (ref 21–32)
CREAT SERPL-MCNC: 1.37 MG/DL (ref 0.6–1.1)
D AG BLD QL: NORMAL
DEVICE (RT): ABNORMAL
DOP CALC AO PEAK VEL: 2.39 M/S
DOP CALC AO VTI: 36.5 CM
DOP CALC LVOT DIAMETER: 1.98 CM
DOP CALC LVOT STROKE VOLUME: 78 CM3
DOP CALC MV VTI: 19.18 CM
DOP CALC RVOT PEAK VEL: 1 M/S
DOP CALCLVOT PEAK VEL VTI: 25.2 CM
E/A RATIO: 1.2
E/E' RATIO (AVERAGE): 10.8
E/E' RATIO: 14.4
EJECTION FRACTION: 73 %
EOSINOPHIL # BLD AUTO: 0 10*3/UL
EOSINOPHIL NFR BLD AUTO: 0 % (ref 0–3)
EPITHELIAL CELLS ON REPIRATORY GRAM STAIN /LPF: NORMAL /LPF
EPITHELIAL CELLS ON REPIRATORY GRAM STAIN /LPF: NORMAL /LPF
ERYTHROCYTE [DISTWIDTH] IN BLOOD BY AUTOMATED COUNT: 13.4 % (ref 11.5–14)
EST. AVERAGE GLUCOSE BLD GHB EST-MCNC: 111.2 MG/DL
FIO2: 100 %
FIO2: 30 %
FIO2: 40 %
FIO2: 50 %
FLUAV RNA NPH QL NAA+NON-PROBE: NEGATIVE
FLUBV RNA NPH QL NAA+NON-PROBE: NEGATIVE
GFR SERPL CREATININE-BSD FRML MDRD: 48 ML/MIN/1.73M*2
GLUCOSE BLDC GLUCOMTR-SCNC: 114 MG/DL (ref 70–105)
GLUCOSE BLDC GLUCOMTR-SCNC: 130 MG/DL (ref 70–105)
GLUCOSE BLDC GLUCOMTR-SCNC: 165 MG/DL (ref 70–105)
GLUCOSE BLDC GLUCOMTR-SCNC: 237 MG/DL (ref 70–105)
GLUCOSE BLDC GLUCOMTR-SCNC: 276 MG/DL (ref 70–105)
GLUCOSE BLDC GLUCOMTR-SCNC: 277 MG/DL (ref 70–105)
GLUCOSE SERPL-MCNC: 212 MG/DL (ref 70–105)
HADV DNA NPH QL NAA+NON-PROBE: NEGATIVE
HBA1C MFR BLD: 5.5 % (ref 4–6)
HCO3 BLDA-SCNC: 22.7 MMOL/L (ref 23–29)
HCO3 BLDA-SCNC: 28.3 MMOL/L (ref 23–29)
HCO3 BLDA-SCNC: 29.7 MMOL/L (ref 23–29)
HCO3 BLDA-SCNC: 33.5 MMOL/L (ref 23–29)
HCOV 229E RNA NPH QL NAA+NON-PROBE: NEGATIVE
HCOV HKU1 RNA NPH QL NAA+NON-PROBE: NEGATIVE
HCOV NL63 RNA NPH QL NAA+NON-PROBE: NEGATIVE
HCOV OC43 RNA NPH QL NAA+NON-PROBE: NEGATIVE
HCT VFR BLD AUTO: 37.4 % (ref 34–45)
HGB BLD-MCNC: 11.7 G/DL (ref 11.5–15.5)
HMPV RNA NPH QL NAA+NON-PROBE: NEGATIVE
HPIV1 RNA NPH QL NAA+NON-PROBE: NEGATIVE
HPIV2 RNA NPH QL NAA+NON-PROBE: NEGATIVE
HPIV3 RNA NPH QL NAA+NON-PROBE: NEGATIVE
HPIV4 RNA NPH QL NAA+NON-PROBE: NEGATIVE
INTERVENTRICULAR SEPTUM: 0.8 CM (ref 0.6–1.1)
IVC PROX: 1.6 CM
LA AREA A4C SYSTOLE: 62.5 CM3
LACTATE BLDV-SCNC: 1.8 MMOL/L (ref 0.5–1.99)
LEFT ATRIUM SIZE: 2.5 CM
LEFT ATRIUM VOLUME INDEX: 37 ML/M2
LEFT ATRIUM VOLUME: 62.6 CM3
LEFT INTERNAL DIMENSION IN SYSTOLE: 2.5 CM (ref 2.1–4)
LEFT VENTRICLE DIASTOLIC VOLUME: 90 CM3
LEFT VENTRICLE SYSTOLIC VOLUME: 24 CM3
LEFT VENTRICULAR INTERNAL DIMENSION IN DIASTOLE: 4.5 CM (ref 3.5–6)
LVAD-AP2: 25.6 CM2
LYMPHOCYTES # BLD AUTO: 0.7 10*3/UL
LYMPHOCYTES NFR BLD AUTO: 6 % (ref 11–47)
M PNEUMO DNA NPH QL NAA+NON-PROBE: NEGATIVE
MCH RBC QN AUTO: 29.6 PG (ref 28–33)
MCHC RBC AUTO-ENTMCNC: 31.3 G/DL (ref 32–36)
MCV RBC AUTO: 94.3 FL (ref 81–97)
ML OF DILUTED DEFINITY INJECTED: 2 ML
MODE (RT): ABNORMAL
MONOCYTES # BLD AUTO: 0.4 10*3/UL
MONOCYTES NFR BLD AUTO: 4 % (ref 3–11)
MUCUS PRESENCE IN REPIRATORY GRAM STAIN: NORMAL
MUCUS PRESENCE IN REPIRATORY GRAM STAIN: NORMAL
MV (RT): 12.6 L/MIN
MV (RT): 5.4 L/MIN
MV (RT): 6.15 L/MIN
MV (RT): 8 L/MIN
MV DEC SLOPE: 3570 MM/S2
MV DT: 206 MS
MV MEAN GRADIENT: 1.8 MMHG
MV PEAK A VEL: 71 CM/S
MV PEAK E VEL: 82.9 CM/S
MV PEAK GRADIENT: 3 MMHG
MV STENOSIS PRESSURE HALF TIME: 71 MS
MV VALVE AREA P 1/2 METHOD: 3.1 CM2
MV VMAX: 82 CM/S
MVA (VTI): 4.05 CM2
NEUTROPHILS # BLD AUTO: 11.1 10*3/UL
NEUTROPHILS NFR BLD AUTO: 90 % (ref 41–81)
ORGANISM PREDOMINANCE IN REPIRATORY GRAM STAIN: NORMAL
ORGANISM PREDOMINANCE IN REPIRATORY GRAM STAIN: NORMAL
OVERALL GRADE OF RESPIRATORY GRAM STAIN: NORMAL
OVERALL GRADE OF RESPIRATORY GRAM STAIN: NORMAL
P/F RATIO: 112 %
P/F RATIO: 169 %
P/F RATIO: 191 %
P/F RATIO: 209 %
PATIENT POSITION: ABNORMAL
PCO2 BLDA: 23.6 MMHG (ref 35–45)
PCO2 BLDA: 33 MMHG (ref 35–45)
PCO2 BLDA: 41.7 MMHG (ref 35–45)
PCO2 BLDA: 84.4 MMHG (ref 35–45)
PEEP SET (RT): 10 CM/H2O
PEEP SET (RT): 6 CM/H2O
PH BLDA: 7.13 PH (ref 7.35–7.45)
PH BLDA: 7.46 PH (ref 7.35–7.45)
PH BLDA: 7.59 PH (ref 7.35–7.45)
PH BLDA: 7.62 PH (ref 7.35–7.45)
PIP OBSERVED (RT): 20 CM/H2O
PIP OBSERVED (RT): 25 CM/H2O
PIP OBSERVED (RT): 31 CM/H2O
PIP OBSERVED (RT): 45 CM/H2O
PIP SET (RT): 22 CM/H2O
PIP SET (RT): 30 CM/H2O
PIP SET (RT): 35 CM/H2O
PLATELET # BLD AUTO: 195 10*3/UL (ref 140–350)
PLATELET CLUMP BLD QL SMEAR: NORMAL
PLATELET MORPHOLOGY IN BLOOD: NORMAL
PMNS ON RESPIRATORY GRAM STAIN /LPF: NORMAL /LPF
PMNS ON RESPIRATORY GRAM STAIN /LPF: NORMAL /LPF
PMV BLD AUTO: 8.3 FL (ref 6.9–10.8)
PO2 BLDA: 112 MMHG (ref 60–80)
PO2 BLDA: 62.7 MMHG (ref 60–80)
PO2 BLDA: 67.4 MMHG (ref 60–80)
PO2 BLDA: 95.3 MMHG (ref 60–80)
POCT CTO2, ARTERIAL: 14.1 ML/DL (ref 15–24)
POCT CTO2, ARTERIAL: 14.9 ML/DL (ref 15–24)
POCT CTO2, ARTERIAL: 15.8 ML/DL (ref 15–24)
POCT CTO2, ARTERIAL: 16.6 ML/DL (ref 15–24)
POCT HEMOGLOBIN (MEASURED), ARTERIAL: 10.5 G/DL (ref 11.5–15.5)
POCT HEMOGLOBIN (MEASURED), ARTERIAL: 11 G/DL (ref 11.5–15.5)
POCT HEMOGLOBIN (MEASURED), ARTERIAL: 11.4 G/DL (ref 11.5–15.5)
POCT HEMOGLOBIN (MEASURED), ARTERIAL: 12.1 G/DL (ref 11.5–15.5)
POCT OXYHEMOGLOBIN, ARTERIAL: 95.4 %
POCT OXYHEMOGLOBIN, ARTERIAL: 95.9 %
POCT OXYHEMOGLOBIN, ARTERIAL: 96.5 %
POCT OXYHEMOGLOBIN, ARTERIAL: 97.9 %
POSTERIOR WALL: 0.8 CM (ref 0.6–1.1)
POTASSIUM SERPL-SCNC: 4.5 MMOL/L (ref 3.5–5.1)
PROCALCITONIN SERPL-MCNC: 0.32 NG/ML
PROT SERPL-MCNC: 8 G/DL (ref 6–8.3)
PV PEAK GRADIENT: 10.76 MMHG
PV VMAX: 1.64 M/S
RA AREA: 15.7 CM2
RBC # BLD AUTO: 3.97 10*6/ΜL (ref 3.7–5.3)
RBC MORPH BLD: NORMAL
REPIRATORY GRAM STAIN INTERP: NORMAL
REPIRATORY GRAM STAIN INTERP: NORMAL
RH CV ECHO AV VALVE AREA VEL: 2.1 CM2
RH CV ECHO AV VALVE AREA VTI: 2.1 CM2
RH LVOT PEAK VELOCITY REST: 1.6 M/S
RR SET (RT): 14 B/MIN
RR SET (RT): 18 B/MIN
RR SET (RT): 20 B/MIN
RR SET (RT): 24 B/MIN
RR TOTAL (RT): 15 B/MIN
RR TOTAL (RT): 18 B/MIN
RR TOTAL (RT): 20 B/MIN
RR TOTAL (RT): 24 B/MIN
RSV RNA NPH QL NAA+NON-PROBE: NEGATIVE
RV AP4 BASE: 3.3 CM
RV+EV RNA NPH QL NAA+NON-PROBE: NEGATIVE
S': 24.5 CM/S
SARS-COV-2 RNA NPH QL NAA+NON-PROBE: NEGATIVE
SODIUM SERPL-SCNC: 141 MMOL/L (ref 135–145)
SPO2 (RT): 100 %
SPO2 (RT): 93 %
SPO2 (RT): 94 %
SPO2 (RT): 97 %
TDI: 5.8 CM/S
TDILATERAL: 11.7 CM/S
TRICUSPID ANNULAR PLANE SYSTOLIC EXCURSION: 2.7 CM
TROPONIN I SERPL-MCNC: 19.8 PG/ML
VT OBSERVED (RT): 368 ML
VT OBSERVED (RT): 370 ML
VT OBSERVED (RT): 446 ML
VT OBSERVED (RT): 552 ML
VT SET (RT): 370 ML
WBC # BLD AUTO: 12.3 10*3/UL (ref 4.5–10.5)

## 2022-06-16 PROCEDURE — 6360000200 HC RX 636 W HCPCS (ALT 250 FOR IP)

## 2022-06-16 PROCEDURE — 83880 ASSAY OF NATRIURETIC PEPTIDE: CPT | Performed by: EMERGENCY MEDICINE

## 2022-06-16 PROCEDURE — 86885 COOMBS TEST INDIRECT QUAL: CPT

## 2022-06-16 PROCEDURE — 2550000100 HC RX 255: Performed by: INTERNAL MEDICINE

## 2022-06-16 PROCEDURE — 36415 COLL VENOUS BLD VENIPUNCTURE: CPT

## 2022-06-16 PROCEDURE — 94002 VENT MGMT INPAT INIT DAY: CPT

## 2022-06-16 PROCEDURE — 82805 BLOOD GASES W/O2 SATURATION: CPT

## 2022-06-16 PROCEDURE — 2590000100 HC RX 259: Performed by: INTERNAL MEDICINE

## 2022-06-16 PROCEDURE — 83036 HEMOGLOBIN GLYCOSYLATED A1C: CPT | Performed by: INTERNAL MEDICINE

## 2022-06-16 PROCEDURE — 99291 CRITICAL CARE FIRST HOUR: CPT | Performed by: INTERNAL MEDICINE

## 2022-06-16 PROCEDURE — 87070 CULTURE OTHR SPECIMN AEROBIC: CPT | Performed by: INTERNAL MEDICINE

## 2022-06-16 PROCEDURE — 71045 X-RAY EXAM CHEST 1 VIEW: CPT

## 2022-06-16 PROCEDURE — 82947 ASSAY GLUCOSE BLOOD QUANT: CPT | Mod: QW

## 2022-06-16 PROCEDURE — 6360000200 HC RX 636 W HCPCS (ALT 250 FOR IP): Performed by: INTERNAL MEDICINE

## 2022-06-16 PROCEDURE — 2580000300 HC RX 258: Performed by: EMERGENCY MEDICINE

## 2022-06-16 PROCEDURE — 99285 EMERGENCY DEPT VISIT HI MDM: CPT

## 2022-06-16 PROCEDURE — 97161 PT EVAL LOW COMPLEX 20 MIN: CPT | Mod: GP | Performed by: PHYSICAL THERAPIST

## 2022-06-16 PROCEDURE — 83605 ASSAY OF LACTIC ACID: CPT | Performed by: INTERNAL MEDICINE

## 2022-06-16 PROCEDURE — 85025 COMPLETE CBC W/AUTO DIFF WBC: CPT | Performed by: EMERGENCY MEDICINE

## 2022-06-16 PROCEDURE — 87633 RESP VIRUS 12-25 TARGETS: CPT | Performed by: EMERGENCY MEDICINE

## 2022-06-16 PROCEDURE — 87205 SMEAR GRAM STAIN: CPT | Performed by: INTERNAL MEDICINE

## 2022-06-16 PROCEDURE — (BLANK) HC ROOM ICU MEDICAL

## 2022-06-16 PROCEDURE — 36600 WITHDRAWAL OF ARTERIAL BLOOD: CPT

## 2022-06-16 PROCEDURE — 93306 TTE W/DOPPLER COMPLETE: CPT | Mod: 26 | Performed by: INTERNAL MEDICINE

## 2022-06-16 PROCEDURE — 6360000200 HC RX 636 W HCPCS (ALT 250 FOR IP): Performed by: EMERGENCY MEDICINE

## 2022-06-16 PROCEDURE — 87040 BLOOD CULTURE FOR BACTERIA: CPT | Performed by: EMERGENCY MEDICINE

## 2022-06-16 PROCEDURE — 96374 THER/PROPH/DIAG INJ IV PUSH: CPT

## 2022-06-16 PROCEDURE — 94799 UNLISTED PULMONARY SVC/PX: CPT

## 2022-06-16 PROCEDURE — 6370000100 HC RX 637 (ALT 250 FOR IP): Performed by: INTERNAL MEDICINE

## 2022-06-16 PROCEDURE — 93306 TTE W/DOPPLER COMPLETE: CPT

## 2022-06-16 PROCEDURE — 6370000100 HC RX 637 (ALT 250 FOR IP)

## 2022-06-16 PROCEDURE — 93005 ELECTROCARDIOGRAM TRACING: CPT | Performed by: EMERGENCY MEDICINE

## 2022-06-16 PROCEDURE — 97165 OT EVAL LOW COMPLEX 30 MIN: CPT | Mod: GO

## 2022-06-16 PROCEDURE — 84484 ASSAY OF TROPONIN QUANT: CPT | Performed by: EMERGENCY MEDICINE

## 2022-06-16 PROCEDURE — 2500000200 HC RX 250 WO HCPCS

## 2022-06-16 PROCEDURE — 71250 CT THORAX DX C-: CPT | Mod: MG

## 2022-06-16 PROCEDURE — 2500000200 HC RX 250 WO HCPCS: Performed by: EMERGENCY MEDICINE

## 2022-06-16 PROCEDURE — 94003 VENT MGMT INPAT SUBQ DAY: CPT

## 2022-06-16 PROCEDURE — 2580000300 HC RX 258: Performed by: INTERNAL MEDICINE

## 2022-06-16 PROCEDURE — 94640 AIRWAY INHALATION TREATMENT: CPT

## 2022-06-16 PROCEDURE — 80053 COMPREHEN METABOLIC PANEL: CPT | Performed by: EMERGENCY MEDICINE

## 2022-06-16 PROCEDURE — 84145 PROCALCITONIN (PCT): CPT | Performed by: INTERNAL MEDICINE

## 2022-06-16 RX ORDER — FLUTICASONE PROPIONATE AND SALMETEROL 250; 50 UG/1; UG/1
1 POWDER RESPIRATORY (INHALATION)
COMMUNITY

## 2022-06-16 RX ORDER — MIDAZOLAM HYDROCHLORIDE 1 MG/ML
INJECTION INTRAMUSCULAR; INTRAVENOUS
Status: COMPLETED
Start: 2022-06-16 | End: 2022-06-16

## 2022-06-16 RX ORDER — SODIUM CHLORIDE 0.9 % (FLUSH) 0.9 %
3 SYRINGE (ML) INJECTION AS NEEDED
Status: DISCONTINUED | OUTPATIENT
Start: 2022-06-16 | End: 2022-06-21 | Stop reason: HOSPADM

## 2022-06-16 RX ORDER — BUMETANIDE 0.25 MG/ML
2 INJECTION, SOLUTION INTRAMUSCULAR; INTRAVENOUS ONCE
Status: COMPLETED | OUTPATIENT
Start: 2022-06-16 | End: 2022-06-16

## 2022-06-16 RX ORDER — PROPOFOL 10 MG/ML
INJECTION, EMULSION INTRAVENOUS
Status: COMPLETED
Start: 2022-06-16 | End: 2022-06-16

## 2022-06-16 RX ORDER — NALOXONE HYDROCHLORIDE 0.4 MG/ML
0.2 INJECTION, SOLUTION INTRAMUSCULAR; INTRAVENOUS; SUBCUTANEOUS AS NEEDED
Status: DISCONTINUED | OUTPATIENT
Start: 2022-06-16 | End: 2022-06-17

## 2022-06-16 RX ORDER — ACETAZOLAMIDE 125 MG/1
250 TABLET ORAL ONCE
Status: COMPLETED | OUTPATIENT
Start: 2022-06-16 | End: 2022-06-16

## 2022-06-16 RX ORDER — AMLODIPINE BESYLATE 5 MG/1
5 TABLET ORAL DAILY
COMMUNITY
End: 2022-06-21 | Stop reason: HOSPADM

## 2022-06-16 RX ORDER — MAGNESIUM SULFATE HEPTAHYDRATE 40 MG/ML
2 INJECTION, SOLUTION INTRAVENOUS AS NEEDED
Status: DISCONTINUED | OUTPATIENT
Start: 2022-06-16 | End: 2022-06-21 | Stop reason: HOSPADM

## 2022-06-16 RX ORDER — FENTANYL CITRATE/PF 50 MCG/ML
25-250 PLASTIC BAG, INJECTION (ML) INTRAVENOUS CONTINUOUS
Status: DISCONTINUED | OUTPATIENT
Start: 2022-06-16 | End: 2022-06-17

## 2022-06-16 RX ORDER — INSULIN ASPART 100 [IU]/ML
0-15 INJECTION, SOLUTION INTRAVENOUS; SUBCUTANEOUS
Status: DISCONTINUED | OUTPATIENT
Start: 2022-06-16 | End: 2022-06-18

## 2022-06-16 RX ORDER — BUDESONIDE 1 MG/2ML
1 INHALANT ORAL
Status: DISCONTINUED | OUTPATIENT
Start: 2022-06-16 | End: 2022-06-16

## 2022-06-16 RX ORDER — IPRATROPIUM BROMIDE AND ALBUTEROL SULFATE 2.5; .5 MG/3ML; MG/3ML
3 SOLUTION RESPIRATORY (INHALATION) EVERY 6 HOURS
COMMUNITY

## 2022-06-16 RX ORDER — MIDAZOLAM HYDROCHLORIDE 1 MG/ML
2 INJECTION INTRAMUSCULAR; INTRAVENOUS ONCE
Status: COMPLETED | OUTPATIENT
Start: 2022-06-16 | End: 2022-06-16

## 2022-06-16 RX ORDER — LEVALBUTEROL 1.25 MG/.5ML
1.25 SOLUTION, CONCENTRATE RESPIRATORY (INHALATION) EVERY 4 HOURS
Status: DISCONTINUED | OUTPATIENT
Start: 2022-06-16 | End: 2022-06-17

## 2022-06-16 RX ORDER — INSULIN ASPART 100 [IU]/ML
0-15 INJECTION, SOLUTION INTRAVENOUS; SUBCUTANEOUS
Status: DISCONTINUED | OUTPATIENT
Start: 2022-06-16 | End: 2022-06-16 | Stop reason: SDUPTHER

## 2022-06-16 RX ORDER — SODIUM CHLORIDE 0.9 % (FLUSH) 0.9 %
10 SYRINGE (ML) INJECTION 2 TIMES DAILY
Status: DISCONTINUED | OUTPATIENT
Start: 2022-06-16 | End: 2022-06-18

## 2022-06-16 RX ORDER — NOREPINEPHRINE BITARTRATE 0.03 MG/ML
.5-3 INJECTION, SOLUTION INTRAVENOUS
Status: DISCONTINUED | OUTPATIENT
Start: 2022-06-16 | End: 2022-06-16

## 2022-06-16 RX ORDER — VECURONIUM BROMIDE 1 MG/ML
INJECTION, POWDER, LYOPHILIZED, FOR SOLUTION INTRAVENOUS
Status: COMPLETED
Start: 2022-06-16 | End: 2022-06-16

## 2022-06-16 RX ORDER — VECURONIUM BROMIDE 1 MG/ML
10 INJECTION, POWDER, LYOPHILIZED, FOR SOLUTION INTRAVENOUS ONCE
Status: COMPLETED | OUTPATIENT
Start: 2022-06-16 | End: 2022-06-16

## 2022-06-16 RX ORDER — METOPROLOL SUCCINATE 50 MG/1
25 TABLET, EXTENDED RELEASE ORAL 2 TIMES DAILY
COMMUNITY
End: 2022-06-21 | Stop reason: HOSPADM

## 2022-06-16 RX ORDER — IPRATROPIUM BROMIDE 0.5 MG/2.5ML
0.5 SOLUTION RESPIRATORY (INHALATION) EVERY 4 HOURS PRN
Status: DISCONTINUED | OUTPATIENT
Start: 2022-06-16 | End: 2022-06-21 | Stop reason: HOSPADM

## 2022-06-16 RX ORDER — ALBUTEROL SULFATE 0.83 MG/ML
5 SOLUTION RESPIRATORY (INHALATION) ONCE
Status: COMPLETED | OUTPATIENT
Start: 2022-06-16 | End: 2022-06-16

## 2022-06-16 RX ORDER — LEVALBUTEROL 1.25 MG/.5ML
1.25 SOLUTION, CONCENTRATE RESPIRATORY (INHALATION) EVERY 4 HOURS PRN
Status: DISCONTINUED | OUTPATIENT
Start: 2022-06-16 | End: 2022-06-21 | Stop reason: HOSPADM

## 2022-06-16 RX ORDER — BISACODYL 10 MG/1
10 SUPPOSITORY RECTAL EVERY 6 HOURS PRN
Status: DISCONTINUED | OUTPATIENT
Start: 2022-06-16 | End: 2022-06-21 | Stop reason: HOSPADM

## 2022-06-16 RX ORDER — ALBUTEROL SULFATE 90 UG/1
INHALANT RESPIRATORY (INHALATION) SEE ADMIN INSTRUCTIONS
Status: ON HOLD | COMMUNITY
End: 2022-06-21 | Stop reason: SDUPTHER

## 2022-06-16 RX ORDER — ACETAMINOPHEN 325 MG/1
650 TABLET ORAL EVERY 6 HOURS PRN
Status: DISCONTINUED | OUTPATIENT
Start: 2022-06-16 | End: 2022-06-21 | Stop reason: HOSPADM

## 2022-06-16 RX ORDER — ENOXAPARIN SODIUM 100 MG/ML
40 INJECTION SUBCUTANEOUS
Status: DISCONTINUED | OUTPATIENT
Start: 2022-06-16 | End: 2022-06-21 | Stop reason: HOSPADM

## 2022-06-16 RX ORDER — ALBUTEROL SULFATE 0.83 MG/ML
SOLUTION RESPIRATORY (INHALATION)
Status: COMPLETED
Start: 2022-06-16 | End: 2022-06-16

## 2022-06-16 RX ORDER — PROPOFOL 10 MG/ML
5-50 INJECTION, EMULSION INTRAVENOUS
Status: DISCONTINUED | OUTPATIENT
Start: 2022-06-16 | End: 2022-06-17

## 2022-06-16 RX ORDER — SODIUM CHLORIDE 0.9 % (FLUSH) 0.9 %
10 SYRINGE (ML) INJECTION AS NEEDED
Status: DISCONTINUED | OUTPATIENT
Start: 2022-06-16 | End: 2022-06-21 | Stop reason: HOSPADM

## 2022-06-16 RX ORDER — ONDANSETRON HYDROCHLORIDE 2 MG/ML
4 INJECTION, SOLUTION INTRAVENOUS EVERY 6 HOURS PRN
Status: DISCONTINUED | OUTPATIENT
Start: 2022-06-16 | End: 2022-06-21 | Stop reason: HOSPADM

## 2022-06-16 RX ORDER — SODIUM CHLORIDE 9 MG/ML
25-50 INJECTION, SOLUTION INTRAVENOUS AS NEEDED
Status: DISCONTINUED | OUTPATIENT
Start: 2022-06-16 | End: 2022-06-21 | Stop reason: HOSPADM

## 2022-06-16 RX ORDER — METOLAZONE 2.5 MG/1
10 TABLET ORAL ONCE
Status: COMPLETED | OUTPATIENT
Start: 2022-06-16 | End: 2022-06-16

## 2022-06-16 RX ORDER — ESOMEPRAZOLE SODIUM 40 MG/1
40 INJECTION, POWDER, LYOPHILIZED, FOR SOLUTION INTRAVENOUS
Status: DISCONTINUED | OUTPATIENT
Start: 2022-06-16 | End: 2022-06-16

## 2022-06-16 RX ORDER — DEXTROSE 40 %
15 GEL (GRAM) ORAL
Status: DISCONTINUED | OUTPATIENT
Start: 2022-06-16 | End: 2022-06-18

## 2022-06-16 RX ORDER — PROPOFOL 10 MG/ML
5-50 INJECTION, EMULSION INTRAVENOUS
Status: DISCONTINUED | OUTPATIENT
Start: 2022-06-16 | End: 2022-06-16 | Stop reason: SDUPTHER

## 2022-06-16 RX ORDER — IPRATROPIUM BROMIDE 0.5 MG/2.5ML
0.5 SOLUTION RESPIRATORY (INHALATION) EVERY 4 HOURS
Status: DISCONTINUED | OUTPATIENT
Start: 2022-06-16 | End: 2022-06-17

## 2022-06-16 RX ORDER — DEXTROSE 50 % IN WATER (D50W) INTRAVENOUS SYRINGE
15-30
Status: DISCONTINUED | OUTPATIENT
Start: 2022-06-16 | End: 2022-06-18

## 2022-06-16 RX ORDER — SENNOSIDES 8.6 MG/1
17.2 TABLET ORAL NIGHTLY
Status: DISCONTINUED | OUTPATIENT
Start: 2022-06-16 | End: 2022-06-21 | Stop reason: HOSPADM

## 2022-06-16 RX ADMIN — BUMETANIDE 2 MG: 0.25 INJECTION, SOLUTION INTRAMUSCULAR; INTRAVENOUS at 05:42

## 2022-06-16 RX ADMIN — LEVALBUTEROL HYDROCHLORIDE 1.25 MG: 1.25 SOLUTION, CONCENTRATE RESPIRATORY (INHALATION) at 14:39

## 2022-06-16 RX ADMIN — IPRATROPIUM BROMIDE 0.5 MG: 0.5 SOLUTION RESPIRATORY (INHALATION) at 14:40

## 2022-06-16 RX ADMIN — IPRATROPIUM BROMIDE 0.5 MG: 0.5 SOLUTION RESPIRATORY (INHALATION) at 23:28

## 2022-06-16 RX ADMIN — LEVALBUTEROL HYDROCHLORIDE 1.25 MG: 1.25 SOLUTION, CONCENTRATE RESPIRATORY (INHALATION) at 11:59

## 2022-06-16 RX ADMIN — ENOXAPARIN SODIUM 40 MG: 100 INJECTION SUBCUTANEOUS at 13:58

## 2022-06-16 RX ADMIN — ALBUTEROL SULFATE 5 MG: 0.83 SOLUTION RESPIRATORY (INHALATION) at 03:21

## 2022-06-16 RX ADMIN — INSULIN ASPART 1 UNITS: 100 INJECTION, SOLUTION INTRAVENOUS; SUBCUTANEOUS at 16:50

## 2022-06-16 RX ADMIN — LEVETIRACETAM 2000 MG: 100 INJECTION INTRAVENOUS at 04:40

## 2022-06-16 RX ADMIN — LEVALBUTEROL HYDROCHLORIDE 1.25 MG: 1.25 SOLUTION, CONCENTRATE RESPIRATORY (INHALATION) at 08:15

## 2022-06-16 RX ADMIN — Medication 75 MCG/HR: at 23:59

## 2022-06-16 RX ADMIN — Medication 10 ML: at 21:00

## 2022-06-16 RX ADMIN — MIDAZOLAM 2 MG: 1 INJECTION INTRAMUSCULAR; INTRAVENOUS at 05:08

## 2022-06-16 RX ADMIN — VECURONIUM BROMIDE 10 MG: 1 INJECTION, POWDER, LYOPHILIZED, FOR SOLUTION INTRAVENOUS at 05:13

## 2022-06-16 RX ADMIN — MIDAZOLAM HYDROCHLORIDE 2 MG: 1 INJECTION INTRAMUSCULAR; INTRAVENOUS at 05:08

## 2022-06-16 RX ADMIN — ACETAZOLAMIDE 250 MG: 125 TABLET ORAL at 21:57

## 2022-06-16 RX ADMIN — INSULIN ASPART 3 UNITS: 100 INJECTION, SOLUTION INTRAVENOUS; SUBCUTANEOUS at 09:40

## 2022-06-16 RX ADMIN — PROPOFOL 35 MCG/KG/MIN: 10 INJECTION, EMULSION INTRAVENOUS at 12:59

## 2022-06-16 RX ADMIN — INSULIN ASPART 2 UNITS: 100 INJECTION, SOLUTION INTRAVENOUS; SUBCUTANEOUS at 12:24

## 2022-06-16 RX ADMIN — LEVALBUTEROL HYDROCHLORIDE 1.25 MG: 1.25 SOLUTION, CONCENTRATE RESPIRATORY (INHALATION) at 18:49

## 2022-06-16 RX ADMIN — ALBUTEROL SULFATE 5 MG: 2.5 SOLUTION RESPIRATORY (INHALATION) at 03:21

## 2022-06-16 RX ADMIN — Medication 50 MCG/HR: at 05:46

## 2022-06-16 RX ADMIN — METOLAZONE 10 MG: 2.5 TABLET ORAL at 05:43

## 2022-06-16 RX ADMIN — BUDESONIDE 1 MG: 1 SUSPENSION RESPIRATORY (INHALATION) at 07:13

## 2022-06-16 RX ADMIN — Medication 2 MCG/MIN: at 04:38

## 2022-06-16 RX ADMIN — MEPERIDINE HYDROCHLORIDE 50 MG: 50 INJECTION, SOLUTION INTRAMUSCULAR; INTRAVENOUS; SUBCUTANEOUS at 05:04

## 2022-06-16 RX ADMIN — METHYLPREDNISOLONE SODIUM SUCCINATE 40 MG: 40 INJECTION, POWDER, FOR SOLUTION INTRAMUSCULAR; INTRAVENOUS at 05:43

## 2022-06-16 RX ADMIN — CEFTRIAXONE SODIUM 1000 MG: 1 INJECTION, POWDER, FOR SOLUTION INTRAMUSCULAR; INTRAVENOUS at 04:29

## 2022-06-16 RX ADMIN — IPRATROPIUM BROMIDE 0.5 MG: 0.5 SOLUTION RESPIRATORY (INHALATION) at 18:49

## 2022-06-16 RX ADMIN — PROPOFOL 45 MCG/KG/MIN: 10 INJECTION, EMULSION INTRAVENOUS at 23:52

## 2022-06-16 RX ADMIN — PROPOFOL 10 MCG/KG/MIN: 10 INJECTION, EMULSION INTRAVENOUS at 03:39

## 2022-06-16 RX ADMIN — SENNOSIDES 17.2 MG: 8.6 TABLET, FILM COATED ORAL at 20:39

## 2022-06-16 RX ADMIN — Medication 10 ML: at 11:30

## 2022-06-16 RX ADMIN — PERFLUTREN 2 ML: 6.52 INJECTION, SUSPENSION INTRAVENOUS at 10:13

## 2022-06-16 RX ADMIN — AZITHROMYCIN MONOHYDRATE 500 MG: 500 INJECTION, POWDER, LYOPHILIZED, FOR SOLUTION INTRAVENOUS at 07:15

## 2022-06-16 RX ADMIN — IPRATROPIUM BROMIDE 0.5 MG: 0.5 SOLUTION RESPIRATORY (INHALATION) at 08:15

## 2022-06-16 RX ADMIN — LEVALBUTEROL HYDROCHLORIDE 1.25 MG: 1.25 SOLUTION, CONCENTRATE RESPIRATORY (INHALATION) at 23:29

## 2022-06-16 RX ADMIN — PROPOFOL 35 MCG/KG/MIN: 10 INJECTION, EMULSION INTRAVENOUS at 20:41

## 2022-06-16 RX ADMIN — PROPOFOL 25 MCG/KG/MIN: 10 INJECTION, EMULSION INTRAVENOUS at 07:40

## 2022-06-16 RX ADMIN — PROPOFOL 25 MCG/KG/MIN: 10 INJECTION, EMULSION INTRAVENOUS at 08:37

## 2022-06-16 RX ADMIN — IPRATROPIUM BROMIDE 0.5 MG: 0.5 SOLUTION RESPIRATORY (INHALATION) at 11:59

## 2022-06-16 RX ADMIN — PROPOFOL 35 MCG/KG/MIN: 10 INJECTION, EMULSION INTRAVENOUS at 18:19

## 2022-06-16 NOTE — INTERDISCIPLINARY/THERAPY
SW attempted to call pt's sister for admit note, sister did not answer phone call. SW to attempt again later.

## 2022-06-16 NOTE — INTERDISCIPLINARY/THERAPY
06/16/22 1050   Time Calculation   Start Time 1050   Stop Time 1058   Time Calculation (min) 8 min   PT Last Visit   PT Received On 06/16/22   General   Chart Reviewed Yes   Therapy Treatment Diagnosis Respiratory failure life flight form ND   PT Treatment Duration (Min) 8 Minutes   Is this a Co-Treatment?   (OT)   Additional Pertinent History Endstage COPD   Family/Caregiver Present No   Precautions   Reinforced Precautions Yes   Other Precautions vent   Home Living   Home Living Comments Unable to obtain PLOF   Subjective Comments   RN Stated patient is medically cleared for therapy Yes   Subjective Comments RN ok'd PROM. Pt supine in bed with UE restraints in place pre and post session. Pt opens eyes briefly but no tracking and no command following.   Other Activities   Other Activities Comments Pt recieved PROM B LE in supine. Some resistance vs increased tone noted B LE with PROM. In resting position in supine ankles noted to be in plantar flexed position. Able to passively move to df past 90degrees.   ICU Early Mobility   Current ICU Mobility Level Does Not Meet Criteria   Assessment   Assessment Comment Pt on vent. Not following commands. PROM WFL but some resistance noted.   Recommendation   Recommendations for Therapy Continue skilled therapy   PT Untimed Charges - Quick List (Time Spent in Minutes)   PT Eval Low Complexity 8   Plan   Treatment Interventions Early mobility   PT Frequency 2-3x/wk   Plan Comment EM0: PROM progress EM as able.   Date POC Due 06/23/22

## 2022-06-16 NOTE — PLAN OF CARE
Problem: Mechanical Ventilation  Goal: ACHIEVES OPTIMAL VENTILATION AND OXYGENATION WITH MECHANICAL VENTILATION SUPPORT  Description: INTERVENTIONS:  1. Provide education to patient/family about rationale and expected outcomes associated with mechanical ventilation  2. Position patient to facilitate optimal ventilation/oxygenation status and minimize respiratory effort  3. Position patient to reduce aspiration risk, elevate head of bed 35 degrees or higher, as applicable  4. Assess effectiveness of therapy on ventilation/oxygenation status based on oxygen saturation, ETCO2 monitors and/or blood gases  5 Assess patient for changes in respiratory and physiological status, monitor vital signs  6. Assess patient for changes in mentation and behavior  7. Assess and monitor skin condition, in relation to the respiratory interface  8. Routinely monitor equipment for proper performance and settings  9. Assure equipment alarm volume is adequate for the patient's environment  10. Immediately respond to ventilator alarm(s), to assess patient and/or cause for alarm  11. Follow ventilator associated events practice standards, as applicable  12. Follow universal infection control and hospital policy(ies)/standards  Outcome: Progressing  Goal: Patient Will Maintain Patent Airway  Description: INTERVENTIONS:  1. Monitor patient for changes in respiratory rate, rhythm, depth and effort  2. Assess and monitor chest excursion  3. Auscultate and monitor breath sounds  4. Monitor changes in SPO2, ETCO2 monitors and/or blood gases  5. Assess and monitor airway secretions and suction as needed  6. Assess and monitor heat/humidity  Outcome: Progressing  Goal: Oral health is maintained or improved  Description: INTERVENTIONS:  1. Assess and monitor condition of skin, lips, tongue, and oral cavity for integrity  2. Perform oral care per hospital standards  3. Suction oral pharynx as needed  Outcome: Progressing  Goal: ET tube will be  managed safely  Description: INTERVENTIONS:  1. Assess and monitor the endotracheal tube, to make sure it is secured to patient  2. Assess and monitor insertion depth at teeth, gum or nare  3. Assess and monitor cuff pressure, as applicable  4. Assure manual resuscitation bag, mask, PEEP valve and suction devices are available at bedside  5. Support ventilator circuit to avoid pressure or drag on endotracheal tube  6. Assess and monitor patient for need of restraints  Outcome: Progressing  Goal: Ability to express needs and understand communication  Description: INTERVENTIONS:  1. Assess and monitor patient's ability to understand information and communicate  2. Provide intervention tools, to promote patient's ability to communicate, as applicable  3. Encourage patient to communicate, as applicable  Outcome: Progressing  Goal: Mobility/activity is maintained at optimum level for patient  Description: INTERVENTIONS:  1. Reposition, turn patient per hospital standards  2. Provide range of motion and encourage mobility as indicated  3. Arrange patient bed in chair position, as applicable  Outcome: Progressing  Goal: Separation from Mechanical Ventilation  Description: INTERVENTIONS:  1. Routinely assess patient for readiness to wean from mechanical ventilation  2. Coordinate daily spontaneous breathing trial with sedation holiday, per hospital policy, to evaluate need for artificial airway  Outcome: Progressing

## 2022-06-16 NOTE — INTERDISCIPLINARY/THERAPY
SW [id number]    CISCO located pt's sister, who states she is NOK. CISCO transferred sister to floor nurse for pt update. Emergency contacts info updated.

## 2022-06-16 NOTE — ED PROCEDURE NOTE
Procedure  ECG 12 lead    Date/Time: 6/16/2022 3:58 AM  Performed by: Young Nolasco DO  Authorized by: Young Nolasco DO     Comments:      Sinus rhythm, rate 80, [place] measurable, QRS 88, QTc 416, normal axis, no acute ST depression or elevation.                 Young Nolasco DO  06/16/22 0358

## 2022-06-16 NOTE — MEDICATION HISTORY SPECIALIST NOTES
[facility] [phone]    CSN: [id number]  : [id number]    Patient unable to verify medications at time of interview. Patient is intubated. I spoke with patient's sister Shelley via phone and she was unable to help verify medications.    Med list updated per resources available at this time:  [facility]- medication list   [facility]- medication list    New medications:  All medications listed in Epic.      Medication History Specialist will continue to follow-up for 3 days. See scanned documents for medication resources to date.

## 2022-06-16 NOTE — PLAN OF CARE
Problem: Infection Control  Goal: MINIMIZE THE ACQUISITION AND TRANSMISSION OF INFECTIOUS AGENTS  Description: INTERVENTIONS:  1. Isolate patient with suspected/diagnosed communicable disease  2. Place on designated isolation precautions  3. Maintain isolation techniques  4. Perform hand hygiene before and after each patient care activity  5. [place] universal precautions  6. Wear PPE as directed for type of isolation  7. Administer antibiotic therapy as ordered  8. Clean the environment appropriately after each patient use  9. Clean patient care equipment after each patient use as it leaves the room  10. Limit number of visitors, as appropriate  Outcome: Progressing     Problem: Knowledge Deficit  Goal: Patient/family/caregiver demonstrates understanding of disease process, treatment plan, medications, and discharge instructions  Description: INTERVENTIONS:   1. Complete learning assessment and assess knowledge base  2. Provide teaching at level of understanding   3. Provide teaching via preferred learning methods  Outcome: Progressing     Problem: Potential for Compromised Skin Integrity  Goal: Skin Integrity is Maintained or Improved  Description: INTERVENTIONS:  1. Assess and monitor skin integrity  2. Collaborate with interdisciplinary team and initiate plans and interventions as needed  3. Alternate a full bath with partial baths for elderly   4. Monitor patient's hygiene practices   5. Collaborate with wound, ostomy, and continence nurse  Outcome: Progressing  Goal: Nutritional status is improving  Description: INTERVENTIONS:  1. Monitor and assess patient for malnutrition (ex- brittle hair, bruises, dry skin, pale skin and conjunctiva, muscle wasting, smooth red tongue, and disorientation)  2. Monitor patient's weight and dietary intake as ordered or per policy  3. Determine patient's food preferences and provide high-protein, high-caloric foods as appropriate  4. Assist patient with eating   5. Allow  adequate time for meals   6. Encourage patient to take dietary supplement as ordered   7. Collaborate with dietitian  8. Include patient/family/caregiver in decisions related to nutrition  Outcome: Progressing  Goal: MOBILITY IS MAINTAINED OR IMPROVED  Description: INTERVENTIONS  1. Collaborate with interdisciplinary team and initiate plan and interventions as ordered (PT/OT)  2. Encourage ambulation  3. Up to chair for meals  4. Monitor for signs of deconditioning  Outcome: Progressing     Problem: Urinary Incontinence  Goal: Perineal skin integrity is maintained or improved  Description: INTERVENTIONS:  1. Assess genitourinary system, perineal skin, labs (urinalysis), and history of incontinence to include past management, aggravating, and alleviating factors  2. Collaborate with interdisciplinary team including wound, ostomy, and continence nurse and initiate plans and interventions as needed  4. Consider urine containment device  5. Apply skin protectant   6. Develop skin care regimen  7. Provide privacy when changing patient's incontinence device to maintain their dignity  Outcome: Progressing     Problem: Safety Adult - Fall  Goal: Free from fall injury  Description: INTERVENTIONS:    Inpatient - Please reference Cares/Safety Flowsheet under [place] Fall Risk for interventions.  Pediatrics - Please reference Peds Daily Cares/Safety Flowsheet under [place] Pediatric Fall Assessment Fall Bundle for interventions  LD/OB - Please reference OB Shift Screening Flowsheet under OB Fall Risk for interventions.  Outcome: Progressing     Problem: Pain - Adult  Goal: Verbalizes/displays adequate comfort level or baseline comfort level  Description: INTERVENTIONS:  1. Encourage patient to monitor pain and request interventions  2. Assess pain using the appropriate pain scale  3. Administer analgesics based on type and severity of pain and evaluate response  4. Educate/Implement non-pharmacological measures as appropriate  and evaluate response  5. Consider cultural, developmental and social influences on pain and pain management  6. Notify Provider if interventions unsuccessful or patient reports new pain  Outcome: Progressing     Problem: Infection - Adult  Goal: Absence of infection during hospitalization  Description: INTERVENTIONS:  1. Assess and monitor for signs and symptoms of infection  2. Monitor lab/diagnostic results  3. Monitor all insertion sites/wounds/incisions  4. Monitor secretions for changes in amount and color  5. Administer medications as ordered  6. Educate and encourage patient and family to use good hand hygiene technique  7. Identify and educate in appropriate isolation precautions for identified infection/condition  Outcome: Progressing     Problem: Safety Adult  Goal: Patient will remain safe during hospitalization  Description: INTERVENTIONS    1. Assess patient for fall risk and implement interventions if needed  2. Use safe transport techniques  3. Assess patient using the appropriate Jarred skin assessment scale  4. Assess patient for risk of aspiration  5. Assess patient for risk of elopement  6. Assess patient for risk of suicide  Outcome: Progressing     Problem: Daily Care  Goal: Daily care needs are met  Description: INTERVENTIONS:   1. Assess and monitor skin integrity  2. Identify patients at risk for skin breakdown on admission and per policy  3. Assess and monitor ability to perform self care and identify potential discharge needs  4. Assess skin integrity/risk for skin breakdown  5. Assist patient with activities of daily living as needed  6. Encourage independent activity per ability   7. Provide mouth care   8. Include patient/family/caregiver in decisions related to daily care   Outcome: Progressing     Problem: Discharge Barriers  Goal: Patient's discharge needs are met  Description: INTERVENTIONS:  1. Assess patient for self-management skills  2. Encourage participation in management  3.  Identify potential discharge barriers on admission and throughout hospital stay  4. Involve patient/family/caregiver in discharge planning process  5. Collaborate with case management/ for discharge needs  Outcome: Progressing     Problem: Safety - Medical Restraint  Goal: Remains free of injury from restraints (Restraint for Interference with Medical Device)  Description: INTERVENTIONS:  1. Determine that other, less restrictive measures have been tried or would not be effective before applying the restraint  2. Evaluate the patient's condition at the time of restraint application  3. Inform patient/family regarding the reason for restraint  4. Monitor safety, psychosocial status, comfort, nutrition and hydration  5. Assess continued need for restraints  6. Identify and implement measures to help patient regain control  Outcome: Progressing

## 2022-06-16 NOTE — INTERDISCIPLINARY/THERAPY
06/16/22 1051   Time Calculation   Start Time 1051   Stop Time 1059   Time Calculation (min) 8 min   General   Chart Reviewed Yes   Therapy Treatment Diagnosis resp failure w/ intubation   OT Treatment Duration (Min) 8 Minutes   Is this a Co-Treatment? Yes  (w/ PT)   Additional Pertinent History end stage COPD   Family/Caregiver Present No   Precautions   Reinforced Precautions Yes   Other Precautions vent, lines, restraints   Home Living   Home Living Comments Pt intubated, not able to provide PLOF/home setup. Per chart, pt lives in [place].   Subjective Comments   RN Stated patient is medically cleared for therapy Yes   Subjective Comments RN ok'd tx for PROM only. Pt remained supine during tx, not alert to participate actively. UE restraints secure at OT departure.   Cognition   Arousal/Alertness Impaired   Cognition Comment Pt opens eyes, but does not track or follow commands.   RUE Assessment   RUE Assessment   (PROM WFL)   LUE Assessment   LUE Assessment   (PROM WFL)   Additional Activities   Additional Activities Comments OT provided PROM to BUE in all planes at shoulder/elbow/wrist/hand with respect to lines. 10 reps x 1 set. Pt does not assist w/ movement, but does resist intermittently. This appears nonpurposeful.   ICU Early Mobility   Current ICU Mobility Level Does Not Meet Criteria   Activity Tolerance   Activity Tolerance Comments VSS on vent   Assessment   Rehab Potential Good   Progress   (POC established today)   Problem List Decreased ADL status;Decreased upper extremity strength;Decreased endurance;Decreased functional mobility;Decreased IADLs   Barriers to Discharge Decreased caregiver support;Inaccessible home environment  (Pt lives in [place].)   Recommendations for Therapy Continue skilled therapy   Assessment Comment Pt presents w/ respiratory failure and end stage COPD. She is currently intubated and appropriate for PROM only. Will benefit from OT services to advance EM as indicated and  promote meaningful participation in ADLs.   OT Untimed Charges - Quick List (Time Spent in Minutes)   OT Eval Low Complexity 8   Plan   Plan Comment EM0: PROM, advance as indicated   Treatment Interventions ADL retraining;Therapeutic activity;Therapeutic exercise;Neuromuscular reeducation;UE strengthening/ROM;Endurance training;Cognitive skills development;Patient/family training;Equipment evaluation/education;Compensatory technique education   OT Frequency 2-3x/wk   Date POC Due 06/23/22

## 2022-06-16 NOTE — H&P
ICU HISTORY AND PHYSICAL NOTE      Subjective     Chief Complaint   Patient presents with   • Shortness of Breath     Pt arrives via flight crew from [place]. Pt has end stage COPD, was told 2 weeks ago she would need a lung transplant to survive. Pt went in tonight for difficulty breathing, flight crew stated she was unarousable upon their arrival and intubated prior to flight. Pt was hypotensive and started on a levo gtt as well. Pt arrives on PEEP 12 and 100% FiO2 with difficulty ventilating. Pt received 500 zithrmax, 250mg solumedrol, 80mg lasix. 500ml NS pta.    Please note, there are no contact numbers for this patient and history was ontained from chart review and discussions with ED staff        Lucretia Sands is an 46 y.o. female with a h/o end stage lung disease attributed to long standing COPD transferred from [facility] after presenting there with increasing respiratory compromise and altered mentation.  Pt reportedly was told she's die within the next two weeks if she didn't get a lung transplant.  Pt was intubated and reportedly difficult to ventilate.  Upon my evaluation, she was on  P high of 36 and peep of 10, rate 24 and with a MV of 6 and 7.13/84/112/27.  I lowered thepeep to 6 and volumes increased.  Pt was given 1 dose of vec and MV increased to 9-10.  Instructions given to decrease inspiratory pressure to < 30 if volumes increase significantly with paralysis and likewise decrease MV accordingly for modest gas coreection.  No oxygenation issues and I was able to wean to 50% prior to leaving the ED and expect to be able to decrease even further in the next hour or two.         ASSESSMENT and PLAN  Hypoxic hypercapnic failure secondary to CHF exacerbation and CLD    RESP   Hypercapnic, hypoxic resp failure    Aggressive diuresis today    Decreased Peep to 6 and improved volumes with resolution of peak pressures    Paralyzed x 1 with vec while getting her settled on the vent    Scheduled  and prn duoneb, bid pulmicort and solumedrol 40Q8h ordered empirically for possible COPD exacerbation ( I think that this could probably be deescalated after looking at he CT however)    F/u abg with resp alkalosis and recommendations made to decrease peak pressures and minute ventilation    FIO2 requirement decreased to 50% prior to leaving the ED    Resp PCR negative    Covid negative    Empiric rocephina nd azithro ordered ( the latter more for anti inflammatory effect)    Blood and sputum cxs pending and empiric rocephin and azithro started    CT appears to be more consistent with pulmonary edema, but this is not reflected in the read    Will need to contact the referring hospital for records, pfts etc        CT CAP:1.  Mosaic attenuation density changes of the lungs can be seen with small airways disease and air trapping. Vascular occlusive disease and respiratory bronchiolitis are in the differential.  2.  Small areas of superimposed airspace density in the left upper lobe and pattern that can be seen with aspiration pneumonitis. Other infectious pneumonitis changes cannot be excluded.  3.  Consolidation of the left greater than right lower lobes in a pattern most suggestive of atelectasis. Superimposed infiltrate is difficult to exclude.  4.  Small right pleural effusion.  5.  Appearance of the kidneys suggestive of ATN.  6.  Otherwise unremarkable noncontrast CT appearance of the upper abdomen.  7.  Nonspecific increased fluid content within the cecum and ascending colon. No thickened bowel or obstructive changes otherwise.  8.  Negative pelvis.    CVS   CHF ( BNP 1670)    Am echo ordered    Aggressive diuresis tonightLow dose pressors started after starting sedaiton    LA/procal wnl cbc ordered   Low dose pressor requirement    Occurred after starting sedation    FEN/GI   OTTONIEL ( 43/1.4)        Prior to Admission medications    Not on File           Current Facility-Administered Medications:   •  Insert  peripheral IV, , , Once **AND** sodium chloride 0.9% (NS) carrier flush 25-50 mL, 25-50 mL, intravenous, PRN **AND** Saline lock IV, , , Once **AND** sodium chloride flush 3 mL, 3 mL, intravenous, PRN, Lashon Ruiz MD  •  norepinephrine (LEVOPHED) 8 mg in  mL infusion (premix), 0.5-30 mcg/min, intravenous, Titrated, Young Nolasco DO, Last Rate: 3.75 mL/hr at 06/16/22 0438, 2 mcg/min at 06/16/22 0438  •  Nozin Nasal Antiseptic POPSwab ampule 1 application, 1 application, nasal, 2x daily, Young Nolasco DO  •  sodium chloride flush 10 mL, 10 mL, intravenous, PRN, Young Nolasco DO  •  azithromycin (ZITHROMAX) 500 mg in sodium chloride 0.9 % 250 mL IVPB - VM, 500 mg, intravenous, Once, Young Nolasco DO  •  senna (SENOKOT) tablet 17.2 mg, 17.2 mg, oral, Nightly, Lashon Ruiz MD  •  bisacodyL (DULCOLAX) suppository 10 mg, 10 mg, rectal, q6h PRN, Lashon Ruiz MD  •  sodium phosphates (FLEET) enema 1 enema, 1 enema, rectal, PRN, Lashon Ruiz MD  •  acetaminophen (TYLENOL) tablet 650 mg, 650 mg, oral, q6h PRN, Lashon Ruiz MD  •  magnesium sulfate 2 g in SWFI 50 mL IVPB (premix), 2 g, intravenous, PRN, Lashon Ruiz MD  •  ondansetron (ZOFRAN) injection 4 mg, 4 mg, intravenous, q6h PRN, Lashon Ruiz MD  •  esomeprazole (NexIUM) injection 40 mg, 40 mg, intravenous, Daily at 1600, Lashon Ruiz MD  •  fentanyl 50 mcg/mL bolus from infusion 50 mcg, 50 mcg, intravenous, q1h PRN, Lashon Ruiz MD  •  fentaNYL (PF) 1,250 mcg/25 mL infusion (premix),  mcg/hr, intravenous, Continuous, Lashon Ruiz MD, Last Rate: 1 mL/hr at 06/16/22 0546, 50 mcg/hr at 06/16/22 0546  •  naloxone (NARCAN) injection 0.2 mg, 0.2 mg, intravenous, PRN, Lashon Ruiz MD  •  enoxaparin (LOVENOX) syringe 40 mg, 40 mg, subcutaneous, Daily at 1400, Lashon Ruiz MD  •  propofol (DIPRIVAN) 10 mg/mL  infusion (premix), 5-50 mcg/kg/min, intravenous, Titrated, Lashon Ruiz MD  •  levalbuterol (XOPENEX) 1.25 mg/0.5 mL nebulizer solution 1.25 mg, 1.25 mg, nebulization, q4h, Lashon Ruiz MD  •  levalbuterol (XOPENEX) 1.25 mg/0.5 mL nebulizer solution 1.25 mg, 1.25 mg, nebulization, q4h PRN, Lashon Ruiz MD  •  ipratropium (ATROVENT) 0.02 % nebulizer solution 0.5 mg, 0.5 mg, nebulization, q4h, Lashon Ruiz MD  •  ipratropium (ATROVENT) 0.02 % nebulizer solution 0.5 mg, 0.5 mg, nebulization, q4h PRN, Lashon Ruiz MD  •  budesonide (PULMICORT) 1 mg/2 mL nebulizer solution 1 mg, 1 mg, nebulization, Daily, Lashon Ruiz MD  •  methylPREDNISolone sod suc(PF) (Solu-MEDROL) injection 40 mg, 40 mg, intravenous, q8h ELIZABET, Lashon Ruiz MD, 40 mg at 06/16/22 0543  •  sodium chloride flush 10 mL, 10 mL, intravenous, 2x daily, Lashon Ruiz MD  •  sodium chloride flush 10 mL, 10 mL, intravenous, PRN, Lashon Ruiz MD  •  [START ON 6/17/2022] cefTRIAXone (ROCEPHIN) 1,000 mg in normal saline 50 mL IVPB - MBP, 1,000 mg, intravenous, q24h, Lashon Ruiz MD  •  [START ON 6/17/2022] azithromycin (ZITHROMAX) 500 mg in sodium chloride 0.9 % 250 mL IVPB - VM, 500 mg, intravenous, q24h, Lashon Ruiz MD      Allergies   Allergen Reactions   • Amoxicillin    • Ciprofloxacin    • Flagyl [Metronidazole]    • Penicillins    • Sulfa (Sulfonamide Antibiotics)        Past Medical History:   Diagnosis Date   • COPD (chronic obstructive pulmonary disease) (CMS/HCC) (HCC)    • Renal disorder           History reviewed. No pertinent surgical history.      History reviewed. No pertinent family history.      Social History     Socioeconomic History   • Marital status: Unknown     Spouse name: Not on file   • Number of children: Not on file   • Years of education: Not on file   • Highest education level: Not on file   Occupational  History   • Not on file   Tobacco Use   • Smoking status: Not on file   • Smokeless tobacco: Not on file   Substance and Sexual Activity   • Alcohol use: Not on file   • Drug use: Not on file   • Sexual activity: Not on file   Other Topics Concern   • Not on file   Social History Narrative   • Not on file     Social Determinants of Health     Financial Resource Strain: Not on file   Food Insecurity: Not on file   Transportation Needs: Not on file   Physical Activity: Not on file   Stress: Not on file   Social Connections: Not on file   Intimate Partner Violence: Not on file   Housing Stability: Not on file          ROS  Unable to obtain    Patient Vitals for the past 24 hrs:   BP Temp Temp src Pulse Resp SpO2 Weight   06/16/22 0545 -- -- -- 111 -- 94 % --   06/16/22 0500 135/95 -- -- 111 24 97 % --   06/16/22 0445 106/54 -- -- 109 24 100 % --   06/16/22 0440 98/57 -- -- 110 -- -- --   06/16/22 0430 (!) 86/44 -- -- 113 24 100 % --   06/16/22 0420 (!) 137/106 -- -- 121 24 98 % --   06/16/22 0415 (!) 121/102 -- -- 111 24 99 % --   06/16/22 0409 -- -- -- 112 -- -- --   06/16/22 0409 -- -- -- -- 19 100 % --   06/16/22 0405 -- -- -- -- 20 100 % --   06/16/22 0405 -- -- -- 112 -- -- --   06/16/22 0400 -- -- -- 113 -- -- --   06/16/22 0400 103/91 -- -- -- -- -- --   06/16/22 0359 -- -- -- -- (!) 39 100 % --   06/16/22 0355 -- -- -- -- (!) 37 100 % --   06/16/22 0355 -- -- -- 116 -- -- --   06/16/22 0349 -- -- -- 119 18 100 % --   06/16/22 0345 102/90 -- -- -- -- -- --   06/16/22 0344 -- -- -- -- 14 100 % --   06/16/22 0344 -- -- -- 117 -- -- --   06/16/22 0339 -- -- -- 107 -- -- --   06/16/22 0339 -- -- -- -- 17 96 % --   06/16/22 0335 -- -- -- -- 20 100 % --   06/16/22 0335 -- -- -- -- -- 100 % --   06/16/22 0334 -- -- -- 77 -- -- --   06/16/22 0330 (!) 150/101 -- -- 68 13 100 % --   06/16/22 0328 -- -- -- 63 -- 99 % --   06/16/22 0326 -- (!) 34.7 °C (94.5 °F) Rectal -- -- -- --   06/16/22 0318 (!) 142/117 -- -- 60 20 (!)  82 % --   06/16/22 0317 -- -- -- -- -- -- 63.2 kg (139 lb 5.3 oz)       Physical exam  GENERAL: Patient appears to be older than stated age.  SKIN: Inspection of the skin reveals no rashes  HEAD: normocephalic, atraumatic  EYES: PERRL  RESPIRATORY: good air entry bilaterally with bibasilar rales and scattered rhochi and intermittent wheezing  CARDIAC: S1, S2 heard, normal intensity of both heart sounds. Normal rate, regular rhythm.   ABDOMEN: Abdomen is not distended.  There is normal bowel sounds heard in all 4 quadrants. The abdomen is soft, non tender.   NEUROLOGIC: pt was following commands for ED staff, but upon my eval  Would not open eyes to name ( prop 30 infusing att the time)  VASCULAR:  2+ pulses bilaterally  MUSCULOSKELETAL: There was no bony tenderness or effusions noted.  EXTREMITIES: no edema. No cyanosis seen    full code  D/W - Dr Burden, RN, RT  CC Time -  45mins   LOS - min 2 [place] stay  D/C- 6/20/22  Support decision maker - none available  GI proph - ppi  Restraints -  yes  x2   : pt/tube/line safety  [place] - yes  :  strict I/O's, hemodynamically unstable, prolonged immobility  DVT - sq lovenox  LINES - piv and midline    PARRISH YOUNG MD  5:59 AM, 6/16/2022.

## 2022-06-16 NOTE — ED PROVIDER NOTES
HPI:  Chief Complaint   Patient presents with   • Shortness of Breath     Pt arrives via flight crew from [place]. Pt has end stage COPD, was told 2 weeks ago she would need a lung transplant to survive. Pt went in tonight for difficulty breathing, flight crew stated she was unarousable upon their arrival and intubated prior to flight. Pt was hypotensive and started on a levo gtt as well. Pt arrives on PEEP 12 and 100% FiO2 with difficulty ventilating. Pt received 500 zithrmax, 250mg solumedrol, 80mg lasix. 500ml NS pta.      HPI    History obtained from flight crew.  Patient was transferred from [place], [place].  Patient had congestion and shortness of breath and headache.  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  When they arrived to the hospital patient was hypotensive was placed on Levophed and was obtunded on 12 L/min nonrebreather.  Patient was intubated for airway protection.    HISTORY:  Past Medical History:   Diagnosis Date   • COPD (chronic obstructive pulmonary disease) (CMS/HCC) ([facility])    • Renal disorder        History reviewed. No pertinent surgical history.    History reviewed. No pertinent family history.         ROS:  Review of Systems   Unable to perform ROS: Intubated   All other systems reviewed and are negative.       PHYSICAL EXAM:  Physical Exam  Vitals and nursing note reviewed.   Constitutional:       Comments: Intubated and unresponsive.   HENT:      Head: Normocephalic and atraumatic.      Mouth/Throat:      Mouth: Mucous membranes are moist.      Pharynx: Oropharynx is clear.   Eyes:      Conjunctiva/sclera: Conjunctivae normal.   Cardiovascular:      Rate and Rhythm: Normal rate and regular rhythm.      Heart sounds: No murmur heard.  Pulmonary:      Effort: Pulmonary effort is normal. No respiratory distress.      Breath sounds: Examination of the right-upper field reveals rhonchi. Examination of the left-upper field reveals rhonchi. Examination of the  right-middle field reveals rhonchi. Examination of the left-middle field reveals rhonchi. Examination of the right-lower field reveals rhonchi. Examination of the left-lower field reveals rhonchi. Rhonchi present.   Abdominal:      Palpations: Abdomen is soft.      Tenderness: There is no abdominal tenderness.   Musculoskeletal:      Cervical back: Neck supple.      Right lower leg: Edema present.      Left lower leg: Edema present.   Skin:     General: Skin is warm and dry.      Capillary Refill: Capillary refill takes less than 2 seconds.          Results for orders placed or performed during the hospital encounter of 06/16/22   CBC w/auto differential Blood, Venous   Result Value Ref Range    WBC 12.3 (H) 4.5 - 10.5 10*3/uL    RBC 3.97 3.70 - 5.30 10*6/µL    Hemoglobin 11.7 11.5 - 15.5 g/dL    Hematocrit 37.4 34.0 - 45.0 %    MCV 94.3 81.0 - 97.0 fL    MCH 29.6 28.0 - 33.0 pg    MCHC 31.3 (L) 32.0 - 36.0 g/dL    RDW 13.4 11.5 - 14.0 %    Platelets 195 140 - 350 10*3/uL    MPV 8.3 6.9 - 10.8 fL    Neutrophils% 90 (H) 41 - 81 %    Lymphocytes% 6 (L) 11 - 47 %    Monocytes% 4 3 - 11 %    Eosinophils% 0 0 - 3 %    Basophils% 0 0 - 2 %    ANC (auto diff) 11.10 10*3/UL    Lymphocytes Absolute 0.70 10*3/uL    Monocytes Absolute 0.40 10*3/uL    Eosinophils Absolute 0.00 10*3/uL    Basophils Absolute 0.00 10*3/uL   Comprehensive metabolic panel Blood, Venous   Result Value Ref Range    Sodium 141 135 - 145 mmol/L    Potassium 4.5 3.5 - 5.1 MMOL/L    Chloride 102 98 - 107 mmol/L    CO2 31 21 - 32 mmol/L    Anion Gap 8 3 - 11 mmol/L    BUN 43 (H) 7 - 25 mg/dL    Creatinine 1.37 (H) 0.60 - 1.10 mg/dL    Glucose 212 (H) 70 - 105 mg/dL    Calcium 10.9 (H) 8.6 - 10.3 mg/dL    AST 19 <40 U/L    ALT (SGPT) 29 7 - 52 U/L    Alkaline Phosphatase 102 (H) 39 - 100 U/L    Total Protein 8.0 6.0 - 8.3 g/dL    Albumin 4.7 3.5 - 5.3 g/dL    Total Bilirubin 0.52 0.20 - 1.40 mg/dL    Corrected Calcium 10.3 8.6 - 10.3 mg/dL    eGFR 48 (L)  >60 mL/min/1.73m*2   HS Troponin I   Result Value Ref Range    hsTnI 0 Hour 19.8 (H) <=14.9 pg/mL   B-type natriuretic peptide (BNP) Blood, Venous   Result Value Ref Range    BNP 1,670 (H) 0 - 100 pg/mL   POCT lactic acid (lactate) Blood, Venous   Result Value Ref Range    POC Lactate 1.80 0.50 - 1.99 MMOL/L   Respiratory Pathogen Panel, PCR Swab   Result Value Ref Range    Adenovirus Detection by PCR Negative Negative    SARS-COV-2 PCR Negative Negative    Human Metapneumovirus Detection by PCR Negative Negative    Rhinovirus/Enterovirus Detection by PCR Negative Negative    Influenza A  Detection by PCR Negative Negative    Influenza B Detection by PCR Negative Negative    Respiratory Syncytial Virus Detection by PCR Negative Negative    Bordetella Pertussis Detection by PCR Negative Negative    Chlamydophila Pneumoniae Detection by PCR Negative Negative    Mycoplasma pneumo by PCR Negative Negative    Bordetella Parapertussis Detection by PCR Negative Negative    Coronavirus 229E Detection by PCR Negative Negative    Coronavirus HKU1 Detection by PCR Negative Negative    Coronavirus NL63  Detection by PCR Negative Negative    Coronavirus OC43 Detection by PCR Negative Negative    Parainfluenza 1 Detection by PCR Negative Negative    Parainfluenza 2 Detection by PCR Negative Negative    Parainfluenza 3 Detection by PCR Negative Negative    Parainfluenza 4 Detection by PCR Negative Negative   Blood gas, arterial (Respiratory Therapy) Blood, Arterial   Result Value Ref Range    pH, Arterial 7.13 (LL) 7.35 - 7.45 PH    pCO2, Arterial 84.4 (H) 35.0 - 45.0 MMHG    pO2, Arterial 112.0 (H) 60.0 - 80.0 MMHG    HCO3, Arterial 28.3 23.0 - 29.0 mmol/L    Base Excess, Arterial -2.6 (L) -2.0 - 2.0 mmol/L    tO2, Arterial 16.6 15.0 - 24.0 mL/dL    Hemoglobin (measured), Arterial 12.1 11.5 - 15.5 g/dL    Oxyhemoglobin, Arterial 96.5 %    FiO2 100 %    Device Ventilator     SpO2 100 %    tCO2 (calculated), Arterial 27.5 (H) 19.0  - 24.0 mmol/L    Mode PC/AC- Pressure control/assist control     PIP Set 35 cm/H2O    PIP Observed 45 cm/H2O    VT Oberved 446 ml    RR Set 20 b/min    RR Total 20 b/min    MV 8 L/min    PEEP Set 10 cm/H2O    P/F Ratio 112 %    Patient Position Supine    Type and screen   Result Value Ref Range    ABO Type O     Rh Type POS     Antibody Screen NEG    Procalcitonin Test Blood, Venous   Result Value Ref Range    Procalcitonin 0.32 <0.50 ng/mL   PLT/RBC Morph Blood, Venous   Result Value Ref Range    Clumped Platelets None Seen None Seen    RBC Morphology Normal Normal    Platelet Morphology Normal Normal   Blood gas, arterial (Respiratory Therapy) Blood, Arterial   Result Value Ref Range    pH, Arterial 7.59 (H) 7.35 - 7.45 PH    pCO2, Arterial 23.6 (L) 35.0 - 45.0 MMHG    pO2, Arterial 95.3 (H) 60.0 - 80.0 MMHG    HCO3, Arterial 22.7 (L) 23.0 - 29.0 mmol/L    Base Excess, Arterial 2.1 (H) -2.0 - 2.0 mmol/L    tO2, Arterial 15.8 15.0 - 24.0 mL/dL    Hemoglobin (measured), Arterial 11.4 (L) 11.5 - 15.5 g/dL    Oxyhemoglobin, Arterial 97.9 %    FiO2 50 %    Device Ventilator     SpO2 97 %    tCO2 (calculated), Arterial 20.4 19.0 - 24.0 mmol/L    Mode PC/AC- Pressure control/assist control     PIP Set 30 cm/H2O    PIP Observed 31 cm/H2O    VT Oberved 552 ml    RR Set 24 b/min    RR Total 24 b/min    MV 12.6 L/min    PEEP Set 6 cm/H2O    P/F Ratio 191 %    Patient Position Supine    POCT glucose results only   Result Value Ref Range    POC Glucose 276 (H) 70 - 105 mg/dL       MDM:     46-year-old female transferred from [place] for respiratory failure.  Patient was reported to have end-stage COPD of unknown etiology.  Patient was placed on the ventilator as well as medical telemetry monitoring.  Medical records were reviewed.  Chest x-ray was obtained which showed patchy infiltrates on the right lung field, left lung field was richa out likely secondary to previous left mainstem bronchus.  ET tube had been retracted  at sending facility.  Patient's ET tube was located at the [facility] and it was retracted 2 cm by respiratory therapist.  I reviewed work-up from the sending facility which showed CAT scan with contrast with no findings for pulmonary embolism.  Patient was placed on propofol for sedation.  Patient was continued on Levophed for her hypotension.  Initial arterial blood gas demonstrated pH 7.13, PCO2 84.4, PO2 of 112.  Cardiac biomarker x1 was mildly elevated 19.8.  Procalcitonin was not elevated.  CBC demonstrated white blood cell count 12.3 patient was anemic platelets are normal.  Comprehensive metabolic panel significant for BUN of 43 creatinine 1.37, glucose 212, calcium 10.9 alk phos 102.  Lactate was not elevated.  Stool PCR swab was negative for acute abnormalities.    Blood cultures were obtained patient was placed on antibiotics for presumptive pneumonia.    No family members were available to discuss case.    Call was placed to intensivist who agreed to hospitalize for further evaluation and management    Differential diagnosis includes not limited to: Congestive heart failure, COPD, respiratory failure, pneumothorax, pneumonia, pulmonary embolism.    I provided critical care services for this patient given the high probability of sudden, clinically significant, and/or life-threatening deterioration requiring full and direct attention, intervention, and personal management while the patient was critical.  I provided  40 min of critical care services in addition to separately billable procedures.     Labs Reviewed   CBC WITH AUTO DIFFERENTIAL - Abnormal       Result Value    WBC 12.3 (*)     RBC 3.97      Hemoglobin 11.7      Hematocrit 37.4      MCV 94.3      MCH 29.6      MCHC 31.3 (*)     RDW 13.4      Platelets 195      MPV 8.3      Neutrophils% 90 (*)     Lymphocytes% 6 (*)     Monocytes% 4      Eosinophils% 0      Basophils% 0      ANC (auto diff) 11.10      Lymphocytes Absolute 0.70      Monocytes  Absolute 0.40      Eosinophils Absolute 0.00      Basophils Absolute 0.00     COMPREHENSIVE METABOLIC PANEL - Abnormal    Sodium 141      Potassium 4.5      Chloride 102      CO2 31      Anion Gap 8      BUN 43 (*)     Creatinine 1.37 (*)     Glucose 212 (*)     Calcium 10.9 (*)     AST 19      ALT (SGPT) 29      Alkaline Phosphatase 102 (*)     Total Protein 8.0      Albumin 4.7      Total Bilirubin 0.52      Corrected Calcium 10.3      eGFR 48 (*)     Narrative:     Calculation based on the 2021 Chronic Kidney Disease Epidemiology Collaboration (CKD-EPI) equation refit without adjustment for race.   HIGH SENSITIVE TROPONIN I - Abnormal    hsTnI 0 Hour 19.8 (*)    B-TYPE NATRIURETIC PEPTIDE (BNP) - Abnormal    BNP 1,670 (*)    BLOOD GAS, ARTERIAL (RESPIRATORY THERAPY) - Abnormal    pH, Arterial 7.13 (*)     pCO2, Arterial 84.4 (*)     pO2, Arterial 112.0 (*)     HCO3, Arterial 28.3      Base Excess, Arterial -2.6 (*)     tO2, Arterial 16.6      Hemoglobin (measured), Arterial 12.1      Oxyhemoglobin, Arterial 96.5      FiO2 100      Device Ventilator      SpO2 100      tCO2 (calculated), Arterial 27.5 (*)     Mode PC/AC- Pressure control/assist control      PIP Set 35      PIP Observed 45      VT Oberved 446      RR Set 20      RR Total 20      MV 8      PEEP Set 10      P/F Ratio 112      Patient Position Supine     POCT LACTIC ACID (LACTATE) - Normal    POC Lactate 1.80     RESPIRATORY PATHOGEN PANEL, PCR - Normal    Adenovirus Detection by PCR Negative      SARS-COV-2 PCR Negative      Human Metapneumovirus Detection by PCR Negative      Rhinovirus/Enterovirus Detection by PCR Negative      Influenza A  Detection by PCR Negative      Influenza B Detection by PCR Negative      Respiratory Syncytial Virus Detection by PCR Negative      Bordetella Pertussis Detection by PCR Negative      Chlamydophila Pneumoniae Detection by PCR Negative      Mycoplasma pneumo by PCR Negative      Bordetella Parapertussis Detection  by PCR Negative      Coronavirus 229E Detection by PCR Negative      Coronavirus HKU1 Detection by PCR Negative      Coronavirus NL63  Detection by PCR Negative      Coronavirus OC43 Detection by PCR Negative      Parainfluenza 1 Detection by PCR Negative      Parainfluenza 2 Detection by PCR Negative      Parainfluenza 3 Detection by PCR Negative      Parainfluenza 4 Detection by PCR Negative      Narrative:     This assay is performed using the FilmArray Respiratory Panel ([employer] Results from this test must be correlated with the clinical history, epidemiological data, and other data available to the clinician evaluating the patient. A negative FilmArray RP result does not exclude the possibility of viral or bacterial infection. Negative test results may occur from the presence of sequence variants in the region targeted by the assay, the presence of inhibitors or an infection caused by an organism not detected by thepanel.   PROCALCITONIN - Normal    Procalcitonin 0.32      Narrative:     Low risk of severe sepsis and/or septic shock.  Concentrations <0.5 ng/mL do not exclude an infection.  It is recommended to retest PCT within 6-24 hours if any concentrations <2.0 ng/mL are obtained.  B[employer]R•A•H•M•S PCT is a registered trademark belonging to [employer]  For more information about the [employer] Procalcitonin assay, please see the lab resources menu or chart search [employer] Procalcitonin.   SMEAR REVIEW FOR PLT/RBC MORPH (LAB USE ONLY) - Normal    Clumped Platelets None Seen      RBC Morphology Normal      Platelet Morphology Normal     BLOOD CULTURES, 2 SETS    Narrative:     The following orders were created for panel order Blood culture, 2 sets.  Procedure                               Abnormality         Status                     ---------                               -----------         ------                     Blood culture, 1 set[[id number]]                              In process                  Blood culture, 1 set[[id number]]                              In process                   Please view results for these tests on the individual orders.   BLOOD CULTURE   BLOOD CULTURE   RESPIRATORY CULTURE (RT TO COLLECT)   RESPIRATORY CULTURE (SLIDE)   BLOOD GAS, ARTERIAL (RESPIRATORY THERAPY)   TYPE AND SCREEN (PERFORMABLE ONLY)    ABO Type O      Rh Type POS      Antibody Screen NEG     TYPE AND SCREEN    Narrative:     The following orders were created for panel order Type and screen.  Procedure                               Abnormality         Status                     ---------                               -----------         ------                     Type and screen[[id number]]                                   Final result               Second/Confirm ABO/RH Typ...[[id number]]                                                   Please view results for these tests on the individual orders.   BLOOD GAS, ARTERIAL (RESPIRATORY THERAPY)   LAVENDER TOP RAINBOW       US guidance (hospital procedures)   Final Result      US guidance (hospital procedures)   Final Result      XR chest portable 1 view    (Results Pending)   CT CHEST ABDOMEN PELVIS WO IV CONTRAST No Oral Contrast    (Results Pending)   US Echo complete    (Results Pending)       ED Medication Administration from 06/16/2022 0310 to 06/16/2022 0535       Date/Time Order Dose Route Action Action by     06/16/2022 0321 albuterol 2.5 mg/3 mL nebulizer solution 5 mg 5 mg nebulization Given CARROL Harris     06/16/2022 0335 norepinephrine (LEVOPHED) 8 mg in  mL infusion (premix) 0 mcg/min intravenous Hold one dose Emrich, G     06/16/2022 0438 norepinephrine (LEVOPHED) 8 mg in  mL infusion (premix) 2 mcg/min intravenous New Bag/New Syringe Emrich, G     06/16/2022 0339 propofol (DIPRIVAN) 10 mg/mL infusion (premix) 10 mcg/kg/min intravenous New Bag/New Syringe Emrich, G     06/16/2022 0339 propofol (DIPRIVAN) 10 mg/mL infusion (premix) 10  mcg/kg/min intravenous Rate/Dose Verify [facility], G     06/16/2022 0411 propofol (DIPRIVAN) 10 mg/mL infusion (premix) 0 mcg/kg/min intravenous [facility]     06/16/2022 0415 propofol (DIPRIVAN) 10 mg/mL infusion (premix) 10 mcg/kg/min intravenous Rate/Dose Verify [facility], G     06/16/2022 0415 propofol (DIPRIVAN) 10 mg/mL infusion (premix) 15 mcg/kg/min intravenous Rate/Dose Change [facility],      06/16/2022 0417 propofol (DIPRIVAN) 10 mg/mL infusion (premix) 25 mcg/kg/min intravenous Rate/Dose Change [facility],      06/16/2022 0418 propofol (DIPRIVAN) 10 mg/mL infusion (premix) 25 mcg/kg/min intravenous Rate/Dose Verify [facility],      06/16/2022 0420 propofol (DIPRIVAN) 10 mg/mL infusion (premix) 25 mcg/kg/min intravenous Rate/Dose Verify [facility],      06/16/2022 0438 propofol (DIPRIVAN) 10 mg/mL infusion (premix) 30 mcg/kg/min intravenous Rate/Dose Change [facility],      06/16/2022 0458 propofol (DIPRIVAN) 10 mg/mL infusion (premix) 30 mcg/kg/min intravenous Rate/Dose Verify [facility]     06/16/2022 0459 propofol (DIPRIVAN) 10 mg/mL infusion (premix) 30 mcg/kg/min intravenous Rate/Dose Verify [facility]     06/16/2022 0508 propofol (DIPRIVAN) 10 mg/mL infusion (premix) 35 mcg/kg/min intravenous Rate/Dose Change [facility],      06/16/2022 0509 propofol (DIPRIVAN) 10 mg/mL infusion (premix) 35 mcg/kg/min intravenous Rate/Dose Verify [facility]     06/16/2022 0440 levETIRAcetam (KEPPRA) 2,000 mg in sodium chloride 0.9 % 120 mL IVPB 2,000 mg intravenous New Bag/New Syringe [facility],      06/16/2022 0445 levETIRAcetam (KEPPRA) 2,000 mg in sodium chloride 0.9 % 120 mL IVPB   intravenous Rate/Dose Verify RJ Yadav     06/16/2022 0451 levETIRAcetam (KEPPRA) 2,000 mg in sodium chloride 0.9 % 120 mL IVPB 0 mg intravenous Stopped RJ Yadav     06/16/2022 0429 cefTRIAXone (ROCEPHIN) 1,000 mg in normal saline 50 mL IVPB - MBP 1,000 mg intravenous New Bag/New Syringe RJ Yadav     06/16/2022 0440 cefTRIAXone (ROCEPHIN) 1,000 mg in  normal saline 50 mL IVPB - MBP 0 mg intravenous Stopped Leif, RJ     06/16/2022 0415 senna (SENOKOT) tablet 17.2 mg 17.2 mg oral Not Given KAREY Jaime     06/16/2022 0415 fentaNYL (PF) 1,250 mcg/25 mL infusion (premix) 25 mcg/hr intravenous Not Given KAREY Jaime     06/16/2022 0415 propofol (DIPRIVAN) 10 mg/mL infusion (premix) 0 mcg/kg/min intravenous [facility] KAREY Jaime     06/16/2022 0415 levalbuterol (XOPENEX) 1.25 mg/0.5 mL nebulizer solution 1.25 mg 1.25 mg nebulization Not Given KAREY Jaime     06/16/2022 0415 ipratropium (ATROVENT) 0.02 % nebulizer solution 0.5 mg 0.5 mg nebulization Not Given KAREY Jaime     06/16/2022 0508 midazolam (VERSED) injection 2 mg 2 mg intravenous Given Leif, G 06/16/2022 0504 meperidine (PF) (DEMEROL) injection 50 mg 50 mg intravenous Given LeifRJ     06/16/2022 0513 VECuronium (NORCURON) injection 10 mg 10 mg intravenous Given Leif, G          PROCEDURES:  Procedures    ED COURSE:     Recent Results (from the past 24 hour(s))   Respiratory Pathogen Panel, PCR Swab    Collection Time: 06/16/22  3:37 AM   Result Value Ref Range    Adenovirus Detection by PCR Negative Negative    SARS-COV-2 PCR Negative Negative    Human Metapneumovirus Detection by PCR Negative Negative    Rhinovirus/Enterovirus Detection by PCR Negative Negative    Influenza A  Detection by PCR Negative Negative    Influenza B Detection by PCR Negative Negative    Respiratory Syncytial Virus Detection by PCR Negative Negative    Bordetella Pertussis Detection by PCR Negative Negative    Chlamydophila Pneumoniae Detection by PCR Negative Negative    Mycoplasma pneumo by PCR Negative Negative    Bordetella Parapertussis Detection by PCR Negative Negative    Coronavirus 229E Detection by PCR Negative Negative    Coronavirus HKU1 Detection by PCR Negative Negative    Coronavirus NL63  Detection by PCR Negative Negative    Coronavirus OC43 Detection by PCR Negative Negative    Parainfluenza 1 Detection by  PCR Negative Negative    Parainfluenza 2 Detection by PCR Negative Negative    Parainfluenza 3 Detection by PCR Negative Negative    Parainfluenza 4 Detection by PCR Negative Negative   POCT lactic acid (lactate) Blood, Venous    Collection Time: 06/16/22  3:43 AM   Result Value Ref Range    POC Lactate 1.80 0.50 - 1.99 MMOL/L   CBC w/auto differential Blood, Venous    Collection Time: 06/16/22  3:46 AM   Result Value Ref Range    WBC 12.3 (H) 4.5 - 10.5 10*3/uL    RBC 3.97 3.70 - 5.30 10*6/µL    Hemoglobin 11.7 11.5 - 15.5 g/dL    Hematocrit 37.4 34.0 - 45.0 %    MCV 94.3 81.0 - 97.0 fL    MCH 29.6 28.0 - 33.0 pg    MCHC 31.3 (L) 32.0 - 36.0 g/dL    RDW 13.4 11.5 - 14.0 %    Platelets 195 140 - 350 10*3/uL    MPV 8.3 6.9 - 10.8 fL    Neutrophils% 90 (H) 41 - 81 %    Lymphocytes% 6 (L) 11 - 47 %    Monocytes% 4 3 - 11 %    Eosinophils% 0 0 - 3 %    Basophils% 0 0 - 2 %    ANC (auto diff) 11.10 10*3/UL    Lymphocytes Absolute 0.70 10*3/uL    Monocytes Absolute 0.40 10*3/uL    Eosinophils Absolute 0.00 10*3/uL    Basophils Absolute 0.00 10*3/uL   Comprehensive metabolic panel Blood, Venous    Collection Time: 06/16/22  3:46 AM   Result Value Ref Range    Sodium 141 135 - 145 mmol/L    Potassium 4.5 3.5 - 5.1 MMOL/L    Chloride 102 98 - 107 mmol/L    CO2 31 21 - 32 mmol/L    Anion Gap 8 3 - 11 mmol/L    BUN 43 (H) 7 - 25 mg/dL    Creatinine 1.37 (H) 0.60 - 1.10 mg/dL    Glucose 212 (H) 70 - 105 mg/dL    Calcium 10.9 (H) 8.6 - 10.3 mg/dL    AST 19 <40 U/L    ALT (SGPT) 29 7 - 52 U/L    Alkaline Phosphatase 102 (H) 39 - 100 U/L    Total Protein 8.0 6.0 - 8.3 g/dL    Albumin 4.7 3.5 - 5.3 g/dL    Total Bilirubin 0.52 0.20 - 1.40 mg/dL    Corrected Calcium 10.3 8.6 - 10.3 mg/dL    eGFR 48 (L) >60 mL/min/1.73m*2   HS Troponin I    Collection Time: 06/16/22  3:46 AM   Result Value Ref Range    hsTnI 0 Hour 19.8 (H) <=14.9 pg/mL   B-type natriuretic peptide (BNP) Blood, Venous    Collection Time: 06/16/22  3:46 AM   Result  Value Ref Range    BNP 1,670 (H) 0 - 100 pg/mL   Procalcitonin Test Blood, Venous    Collection Time: 06/16/22  3:46 AM   Result Value Ref Range    Procalcitonin 0.32 <0.50 ng/mL   PLT/RBC Morph Blood, Venous    Collection Time: 06/16/22  3:46 AM   Result Value Ref Range    Clumped Platelets None Seen None Seen    RBC Morphology Normal Normal    Platelet Morphology Normal Normal   Blood gas, arterial (Respiratory Therapy) Blood, Arterial    Collection Time: 06/16/22  3:56 AM   Result Value Ref Range    pH, Arterial 7.13 (LL) 7.35 - 7.45 PH    pCO2, Arterial 84.4 (H) 35.0 - 45.0 MMHG    pO2, Arterial 112.0 (H) 60.0 - 80.0 MMHG    HCO3, Arterial 28.3 23.0 - 29.0 mmol/L    Base Excess, Arterial -2.6 (L) -2.0 - 2.0 mmol/L    tO2, Arterial 16.6 15.0 - 24.0 mL/dL    Hemoglobin (measured), Arterial 12.1 11.5 - 15.5 g/dL    Oxyhemoglobin, Arterial 96.5 %    FiO2 100 %    Device Ventilator     SpO2 100 %    tCO2 (calculated), Arterial 27.5 (H) 19.0 - 24.0 mmol/L    Mode PC/AC- Pressure control/assist control     PIP Set 35 cm/H2O    PIP Observed 45 cm/H2O    VT Oberved 446 ml    RR Set 20 b/min    RR Total 20 b/min    MV 8 L/min    PEEP Set 10 cm/H2O    P/F Ratio 112 %    Patient Position Supine    Type and screen    Collection Time: 06/16/22  4:31 AM   Result Value Ref Range    ABO Type O     Rh Type POS     Antibody Screen NEG    POCT glucose results only    Collection Time: 06/16/22  5:42 AM   Result Value Ref Range    POC Glucose 276 (H) 70 - 105 mg/dL   Blood gas, arterial (Respiratory Therapy) Blood, Arterial    Collection Time: 06/16/22  6:06 AM   Result Value Ref Range    pH, Arterial 7.59 (H) 7.35 - 7.45 PH    pCO2, Arterial 23.6 (L) 35.0 - 45.0 MMHG    pO2, Arterial 95.3 (H) 60.0 - 80.0 MMHG    HCO3, Arterial 22.7 (L) 23.0 - 29.0 mmol/L    Base Excess, Arterial 2.1 (H) -2.0 - 2.0 mmol/L    tO2, Arterial 15.8 15.0 - 24.0 mL/dL    Hemoglobin (measured), Arterial 11.4 (L) 11.5 - 15.5 g/dL    Oxyhemoglobin, Arterial  97.9 %    FiO2 50 %    Device Ventilator     SpO2 97 %    tCO2 (calculated), Arterial 20.4 19.0 - 24.0 mmol/L    Mode PC/AC- Pressure control/assist control     PIP Set 30 cm/H2O    PIP Observed 31 cm/H2O    VT Oberved 552 ml    RR Set 24 b/min    RR Total 24 b/min    MV 12.6 L/min    PEEP Set 6 cm/H2O    P/F Ratio 191 %    Patient Position Supine               CLINICAL IMPRESSION:  Final diagnoses:   [J96.90] Respiratory failure (CMS/[facility]) ([facility])   [I50.9] Congestive heart failure (CHF) (CMS/[facility]) ([facility])   [J44.9] COPD (chronic obstructive pulmonary disease) (CMS/[facility]) ([facility])       I, Dr. Nolasco, personally performed the services described in this documentation as described by dread in my presence and it is both accurate and complete.     A voice recognition program was used to aid in documentation of this record.  Sometimes words are not printed exactly as they were spoken.  While efforts were made to carefully edit and correct any inaccuracies, some areas may be present; please take these into context.  Please contact the provider if areas are identified.       Young Nolasco,   06/16/22 0712       Young Nolasco DO  06/16/22 0714

## 2022-06-17 PROBLEM — I10 PRIMARY HYPERTENSION: Status: ACTIVE | Noted: 2022-06-17

## 2022-06-17 LAB
ALBUMIN SERPL-MCNC: 4.1 G/DL (ref 3.5–5.3)
ANION GAP SERPL CALC-SCNC: 9 MMOL/L (ref 3–11)
BASE EXCESS BLDA CALC-SCNC: 10.7 MMOL/L (ref -2–2)
BUN SERPL-MCNC: 49 MG/DL (ref 7–25)
CALCIUM ALBUM COR SERPL-MCNC: 11.1 MG/DL (ref 8.6–10.3)
CALCIUM SERPL-MCNC: 11.2 MG/DL (ref 8.6–10.3)
CHLORIDE SERPL-SCNC: 98 MMOL/L (ref 98–107)
CO2 BLDA-SCNC: 29.9 MMOL/L (ref 19–24)
CO2 SERPL-SCNC: 33 MMOL/L (ref 21–32)
CREAT SERPL-MCNC: 1.14 MG/DL (ref 0.6–1.1)
DEVICE (RT): ABNORMAL
FIO2: 30 %
GFR SERPL CREATININE-BSD FRML MDRD: 59 ML/MIN/1.73M*2
GLUCOSE BLDC GLUCOMTR-SCNC: 102 MG/DL (ref 70–105)
GLUCOSE BLDC GLUCOMTR-SCNC: 109 MG/DL (ref 70–105)
GLUCOSE BLDC GLUCOMTR-SCNC: 113 MG/DL (ref 70–105)
GLUCOSE BLDC GLUCOMTR-SCNC: 134 MG/DL (ref 70–105)
GLUCOSE BLDC GLUCOMTR-SCNC: 136 MG/DL (ref 70–105)
GLUCOSE SERPL-MCNC: 138 MG/DL (ref 70–105)
HCO3 BLDA-SCNC: 33.1 MMOL/L (ref 23–29)
MAGNESIUM SERPL-MCNC: 2 MG/DL (ref 1.8–2.4)
MODE (RT): ABNORMAL
MV (RT): 4.56 L/MIN
P/F RATIO: 221 %
PATIENT POSITION: ABNORMAL
PCO2 BLDA: 34.8 MMHG (ref 35–45)
PEEP SET (RT): 6 CM/H2O
PH BLDA: 7.59 PH (ref 7.35–7.45)
PHOSPHATE SERPL-MCNC: 4 MG/DL (ref 2.5–4.9)
PIP OBSERVED (RT): 21 CM/H2O
PO2 BLDA: 66.2 MMHG (ref 60–80)
POCT CTO2, ARTERIAL: 14.7 ML/DL (ref 15–24)
POCT HEMOGLOBIN (MEASURED), ARTERIAL: 10.9 G/DL (ref 11.5–15.5)
POCT OXYHEMOGLOBIN, ARTERIAL: 95.5 %
POTASSIUM SERPL-SCNC: 4.2 MMOL/L (ref 3.5–5.1)
RR SET (RT): 14 B/MIN
RR TOTAL (RT): 14 B/MIN
SODIUM SERPL-SCNC: 140 MMOL/L (ref 135–145)
SPO2 (RT): 96 %
VT OBSERVED (RT): 380 ML
VT SET (RT): 370 ML

## 2022-06-17 PROCEDURE — 83735 ASSAY OF MAGNESIUM: CPT | Performed by: INTERNAL MEDICINE

## 2022-06-17 PROCEDURE — 2580000300 HC RX 258: Performed by: INTERNAL MEDICINE

## 2022-06-17 PROCEDURE — 6360000200 HC RX 636 W HCPCS (ALT 250 FOR IP): Performed by: INTERNAL MEDICINE

## 2022-06-17 PROCEDURE — 36415 COLL VENOUS BLD VENIPUNCTURE: CPT | Performed by: INTERNAL MEDICINE

## 2022-06-17 PROCEDURE — 6370000100 HC RX 637 (ALT 250 FOR IP): Performed by: NURSE PRACTITIONER

## 2022-06-17 PROCEDURE — 94003 VENT MGMT INPAT SUBQ DAY: CPT

## 2022-06-17 PROCEDURE — 97530 THERAPEUTIC ACTIVITIES: CPT | Mod: GP

## 2022-06-17 PROCEDURE — 6360000200 HC RX 636 W HCPCS (ALT 250 FOR IP): Performed by: NURSE PRACTITIONER

## 2022-06-17 PROCEDURE — 80069 RENAL FUNCTION PANEL: CPT | Performed by: INTERNAL MEDICINE

## 2022-06-17 PROCEDURE — 6370000100 HC RX 637 (ALT 250 FOR IP): Performed by: INTERNAL MEDICINE

## 2022-06-17 PROCEDURE — 94799 UNLISTED PULMONARY SVC/PX: CPT

## 2022-06-17 PROCEDURE — 94640 AIRWAY INHALATION TREATMENT: CPT

## 2022-06-17 PROCEDURE — 2500000200 HC RX 250 WO HCPCS: Performed by: INTERNAL MEDICINE

## 2022-06-17 PROCEDURE — (BLANK) HC ROOM ICU INTERMEDIATE

## 2022-06-17 PROCEDURE — 82947 ASSAY GLUCOSE BLOOD QUANT: CPT | Mod: QW

## 2022-06-17 PROCEDURE — 99233 SBSQ HOSP IP/OBS HIGH 50: CPT | Performed by: INTERNAL MEDICINE

## 2022-06-17 PROCEDURE — 97530 THERAPEUTIC ACTIVITIES: CPT | Mod: GO

## 2022-06-17 RX ORDER — DEXMEDETOMIDINE HYDROCHLORIDE 4 UG/ML
.2-1.5 INJECTION, SOLUTION INTRAVENOUS
Status: DISCONTINUED | OUTPATIENT
Start: 2022-06-17 | End: 2022-06-17

## 2022-06-17 RX ORDER — IPRATROPIUM BROMIDE 0.5 MG/2.5ML
0.5 SOLUTION RESPIRATORY (INHALATION)
Status: COMPLETED | OUTPATIENT
Start: 2022-06-17 | End: 2022-06-20

## 2022-06-17 RX ORDER — GUAIFENESIN 600 MG/1
600 TABLET, EXTENDED RELEASE ORAL 2 TIMES DAILY
Status: DISCONTINUED | OUTPATIENT
Start: 2022-06-17 | End: 2022-06-21 | Stop reason: HOSPADM

## 2022-06-17 RX ORDER — METOPROLOL TARTRATE 25 MG/1
12.5 TABLET, FILM COATED ORAL 2 TIMES DAILY
Status: DISCONTINUED | OUTPATIENT
Start: 2022-06-17 | End: 2022-06-18

## 2022-06-17 RX ORDER — HYDRALAZINE HYDROCHLORIDE 20 MG/ML
10 INJECTION INTRAMUSCULAR; INTRAVENOUS ONCE
Status: COMPLETED | OUTPATIENT
Start: 2022-06-17 | End: 2022-06-17

## 2022-06-17 RX ORDER — LEVALBUTEROL 1.25 MG/.5ML
1.25 SOLUTION, CONCENTRATE RESPIRATORY (INHALATION)
Status: COMPLETED | OUTPATIENT
Start: 2022-06-17 | End: 2022-06-20

## 2022-06-17 RX ORDER — HYDROCODONE BITARTRATE AND ACETAMINOPHEN 5; 325 MG/1; MG/1
1 TABLET ORAL EVERY 6 HOURS PRN
Status: DISCONTINUED | OUTPATIENT
Start: 2022-06-17 | End: 2022-06-21 | Stop reason: HOSPADM

## 2022-06-17 RX ORDER — AMLODIPINE BESYLATE 5 MG/1
5 TABLET ORAL DAILY
Status: DISCONTINUED | OUTPATIENT
Start: 2022-06-17 | End: 2022-06-18

## 2022-06-17 RX ORDER — HYDRALAZINE HYDROCHLORIDE 20 MG/ML
5 INJECTION INTRAMUSCULAR; INTRAVENOUS EVERY 4 HOURS PRN
Status: DISCONTINUED | OUTPATIENT
Start: 2022-06-17 | End: 2022-06-21 | Stop reason: HOSPADM

## 2022-06-17 RX ADMIN — IPRATROPIUM BROMIDE 0.5 MG: 0.5 SOLUTION RESPIRATORY (INHALATION) at 11:11

## 2022-06-17 RX ADMIN — IPRATROPIUM BROMIDE 0.5 MG: 0.5 SOLUTION RESPIRATORY (INHALATION) at 03:55

## 2022-06-17 RX ADMIN — AMLODIPINE BESYLATE 5 MG: 5 TABLET ORAL at 19:26

## 2022-06-17 RX ADMIN — METOPROLOL TARTRATE 12.5 MG: 25 TABLET, FILM COATED ORAL at 14:37

## 2022-06-17 RX ADMIN — LEVALBUTEROL HYDROCHLORIDE 1.25 MG: 1.25 SOLUTION, CONCENTRATE RESPIRATORY (INHALATION) at 03:55

## 2022-06-17 RX ADMIN — GUAIFENESIN 600 MG: 600 TABLET, EXTENDED RELEASE ORAL at 20:09

## 2022-06-17 RX ADMIN — HYDRALAZINE HYDROCHLORIDE 5 MG: 20 INJECTION INTRAMUSCULAR; INTRAVENOUS at 15:27

## 2022-06-17 RX ADMIN — LEVALBUTEROL HYDROCHLORIDE 1.25 MG: 1.25 SOLUTION, CONCENTRATE RESPIRATORY (INHALATION) at 07:23

## 2022-06-17 RX ADMIN — METHYLPREDNISOLONE SODIUM SUCCINATE 40 MG: 40 INJECTION, POWDER, FOR SOLUTION INTRAMUSCULAR; INTRAVENOUS at 08:29

## 2022-06-17 RX ADMIN — CEFTRIAXONE SODIUM 1000 MG: 1 INJECTION, POWDER, FOR SOLUTION INTRAMUSCULAR; INTRAVENOUS at 05:46

## 2022-06-17 RX ADMIN — ACETAMINOPHEN 650 MG: 325 TABLET ORAL at 14:38

## 2022-06-17 RX ADMIN — Medication 10 ML: at 20:07

## 2022-06-17 RX ADMIN — AMLODIPINE BESYLATE 5 MG: 5 TABLET ORAL at 14:38

## 2022-06-17 RX ADMIN — HYDRALAZINE HYDROCHLORIDE 10 MG: 20 INJECTION INTRAMUSCULAR; INTRAVENOUS at 20:21

## 2022-06-17 RX ADMIN — HYDROCODONE BITARTRATE AND ACETAMINOPHEN 1 TABLET: 5; 325 TABLET ORAL at 20:09

## 2022-06-17 RX ADMIN — LEVALBUTEROL HYDROCHLORIDE 1.25 MG: 1.25 SOLUTION, CONCENTRATE RESPIRATORY (INHALATION) at 18:57

## 2022-06-17 RX ADMIN — PROPOFOL 20 MCG/KG/MIN: 10 INJECTION, EMULSION INTRAVENOUS at 05:53

## 2022-06-17 RX ADMIN — ONDANSETRON 4 MG: 2 INJECTION INTRAMUSCULAR; INTRAVENOUS at 19:09

## 2022-06-17 RX ADMIN — LEVALBUTEROL HYDROCHLORIDE 1.25 MG: 1.25 SOLUTION, CONCENTRATE RESPIRATORY (INHALATION) at 11:11

## 2022-06-17 RX ADMIN — ENOXAPARIN SODIUM 40 MG: 100 INJECTION SUBCUTANEOUS at 14:38

## 2022-06-17 RX ADMIN — DEXMEDETOMIDINE HYDROCHLORIDE 0.5 MCG/KG/HR: 400 INJECTION INTRAVENOUS at 00:15

## 2022-06-17 RX ADMIN — AZITHROMYCIN MONOHYDRATE 500 MG: 500 INJECTION, POWDER, LYOPHILIZED, FOR SOLUTION INTRAVENOUS at 06:17

## 2022-06-17 RX ADMIN — DEXMEDETOMIDINE HYDROCHLORIDE 0.6 MCG/KG/HR: 400 INJECTION INTRAVENOUS at 09:53

## 2022-06-17 RX ADMIN — IPRATROPIUM BROMIDE 0.5 MG: 0.5 SOLUTION RESPIRATORY (INHALATION) at 07:23

## 2022-06-17 RX ADMIN — Medication 10 ML: at 09:00

## 2022-06-17 RX ADMIN — IPRATROPIUM BROMIDE 0.5 MG: 0.5 SOLUTION RESPIRATORY (INHALATION) at 18:56

## 2022-06-17 RX ADMIN — HYDRALAZINE HYDROCHLORIDE 5 MG: 20 INJECTION INTRAMUSCULAR; INTRAVENOUS at 20:04

## 2022-06-17 ASSESSMENT — ACTIVITIES OF DAILY LIVING (ADL): ADEQUATE_TO_COMPLETE_ADL: YES

## 2022-06-17 NOTE — ASSESSMENT & PLAN NOTE
Mildly elevated creatinine around 1.4 admission.  Unknown baseline.  Repeat renal function panel this morning pending.

## 2022-06-17 NOTE — INTERDISCIPLINARY/THERAPY
06/17/22 1330   Time Calculation   Start Time 1330   Stop Time 1340   Time Calculation (min) 10 min   Pain Assessment Scale   Pain Scale 0-10   0-10 Pain Score   (doesn't rate but reports ongoing headache)   General   Chart Reviewed Yes   Therapy Treatment Diagnosis respiratory failure   Is this a Co-Treatment? Yes   Additional Pertinent History COPD, multiple foot surgeries per her report for equinus feet deformities   Precautions   Reinforced Precautions Yes   Other Precautions fall risk, monitor BP   Subjective Comments   RN Stated patient is medically cleared for therapy Yes   Subjective Comments Held in a.m. due to agitation and planned extubation. In p.m. pt is agreeable to PT/OT bedside session, but medical status limitations prevent us from progressing OOB at this time. She is left supine in bed with call light/desired items in reach, RN aware.   Cognition   Arousal/Alertness WFL   Cognition Comment Cooperative, pleasant, able to make needs known, and oriented. Says she lives alone in a house with no stairs where she was functionally independent, no DME. Does report a signfiicant fall hx. Uses 1L O2 at baseline.   Bed Mobility   Bed Mobility Assessed   Bed Mobility 1   Bed Mobility Comments 1 From semifowler position pt comes to long sit with SBA, then lays back down.   Transfers   Transfer Not Assessed   Balance   Balance   (TBE, sits briefly in long sit with SBA)   Spine Assessment   Spine Location Assessment    (rounded shoulders, thoracic kyphosis)   RLE Assessment   RLE Assessment X  (B ankle equinus position with reduced AROM DF but PROM is WFL B'ly; pt denies current use of braces)   LLE Assessment   LLE Assessment X   Therapeutic Activities   Therapeutic Activities Pt education re: PT POC, activity recommendations   ICU Early Mobility   Current ICU Mobility Level Does Not Meet Criteria   ICU Early Mobility PT Only Comments Blood pressure and MAP is outside our parameters for early mobility    Activity Tolerance   Activity Tolerance Comments SaO2 and HR WNL on 1L O2, but /128, . Pt is warm to touch and says she feels very dizzy and shaky.   Assessment   Progress Slow progress, decreased activity tolerance   Problem List Decreased mobility   Barriers to Discharge Decreased caregiver support   Assessment Comment Lucretia was extubated today and able to tolerate minimal bed level activity during therapy. PT services indicated to progress mobility with LRAD, reduce risk of falls/deconditioning, assist with d/c planning. We will adjust treatment frequency now that patient is extubated and able to start progressing to OOB, provided she stabilizes medically to EM parameters.   Recommendation   Recommendations for Therapy Continue skilled therapy   Equipment Recommended   (TBD)   Therapeutic Interventions (Time Spent in Minutes)   Therapeutic Activity 10   Plan   Treatment Interventions Bed mobility;Functional transfer training;Gait training;Stair training;Balance training;Therapeutic activities;Endurance training;Therapeutic exercises;Equipment evaluation/education;Neuromuscular re-education;Caregiver training;Pain education/TNE;Early mobility   PT Frequency 3-5x/wk   Plan Comment EM0, trial EOB and progress as able   Date POC Due 06/23/22

## 2022-06-17 NOTE — INTERDISCIPLINARY/THERAPY
06/17/22 1329   Time Calculation   Start Time 1329   Stop Time 1339   Time Calculation (min) 10 min   General   Chart Reviewed Yes   Therapy Treatment Diagnosis resp failure w/ intubation   OT Treatment Duration (Min) 10 Minutes   Is this a Co-Treatment? Yes  (PT)   Family/Caregiver Present No   Precautions   Reinforced Precautions Yes   Other Precautions lines, monitor BP   Subjective Comments   RN Stated patient is medically cleared for therapy Yes   Subjective Comments Pt now extubated, RN ok'd tx. Pt appropriate for bed level session only d/t high BP and pt c/o dizziness, shaky. Pt left in bed with call light at side, needs in reach.   Pain Assessment Scale   0-10 Pain Score   (headache, not rated)   Cognition   Arousal/Alertness WFL   Cognition Comment pleasant and appropriate. Oriented x 4   Bed Mobility 1   Bed Mobility Comments 1 repositions self in bed w/ SBA including supine w/ HOB elevated to long sit in bed.   Additional Activities   Additional Activities Comments OT gathered additional PLOF from pt as she is now extubated. She reports living alone in [place], [place] in a one level home. She is (I) at baseline without AD. Has a tub/shower w/ grab bar. Uses 1L O2. Endorses frequent falls at home. Has limited support system.   ICU Early Mobility   Current ICU Mobility Level Does Not Meet Criteria   Activity Tolerance   Activity Tolerance Comments SpO2 and HR WFL on 1L O2. /128 . Pt symptomatic w/ report of headache, dizzy, shaky.   Assessment   Rehab Potential Good   Progress Progressing toward goals   Recommendations for Therapy Continue skilled therapy   Assessment Comment Pt now alert and able to actively participate in session. However, BP limits ability to advance to EOB currently. Pt will benefit from OT services to progress EM and promote functional (I) w/ ADLs.   Therapeutic Interventions (Time Spent in Minutes)   Therapeutic Activity Dynamic 10   Plan   Plan Comment EM0: bed level ex,  advance to EOB as able   OT Frequency 3-5x/wk

## 2022-06-17 NOTE — MEDICATION HISTORY SPECIALIST NOTES
[facility] [phone]    Follow up on previous unable to assess patient. Patient initially updated by med history specialist with information available at that time.    Patient remains intubated. Family is unable to help.

## 2022-06-17 NOTE — PROBLEM ORIENTED CHARTING DOCUMENTATION
Patient is a 46-year-old female admitted with an acute exacerbation of COPD with acute hypoxemic and hypercapnic respiratory failure with encephalopathy.  Patient apparently has significant underlying lung disease which was noted to be end-stage and was told that she may need a lung transplant.  Patient was intubated and [place], [place], and transferred to [facility] for further management and care.  Upon arrival patient had difficulty with oxygenation/ventilation thus ventilator was adjusted.  She did require intermittent paralytics to maintain safe ventilation.  Patient also with a potential noted history of CHF.  She had an echocardiogram done on 6/16 that showed normal overall function thus ruling out heart failure.  Patient did have a CT of the chest that showed evidence of air trapping with potential underlying pneumonitis versus pneumonia.  Patient was treated with antibiotics, steroids and scheduled nebs.  Patient also with likely evidence of OTTONIEL as creatinine was elevated at 1.4 but baseline unknown.

## 2022-06-17 NOTE — PLAN OF CARE
Problem: Infection Control  Goal: MINIMIZE THE ACQUISITION AND TRANSMISSION OF INFECTIOUS AGENTS  Description: INTERVENTIONS:  1. Isolate patient with suspected/diagnosed communicable disease  2. Place on designated isolation precautions  3. Maintain isolation techniques  4. Perform hand hygiene before and after each patient care activity  5. [place] universal precautions  6. Wear PPE as directed for type of isolation  7. Administer antibiotic therapy as ordered  8. Clean the environment appropriately after each patient use  9. Clean patient care equipment after each patient use as it leaves the room  10. Limit number of visitors, as appropriate  Outcome: Progressing     Problem: Knowledge Deficit  Goal: Patient/family/caregiver demonstrates understanding of disease process, treatment plan, medications, and discharge instructions  Description: INTERVENTIONS:   1. Complete learning assessment and assess knowledge base  2. Provide teaching at level of understanding   3. Provide teaching via preferred learning methods  Outcome: Progressing     Problem: Potential for Compromised Skin Integrity  Goal: Skin Integrity is Maintained or Improved  Description: INTERVENTIONS:  1. Assess and monitor skin integrity  2. Collaborate with interdisciplinary team and initiate plans and interventions as needed  3. Alternate a full bath with partial baths for elderly   4. Monitor patient's hygiene practices   5. Collaborate with wound, ostomy, and continence nurse  Outcome: Progressing  Goal: Nutritional status is improving  Description: INTERVENTIONS:  1. Monitor and assess patient for malnutrition (ex- brittle hair, bruises, dry skin, pale skin and conjunctiva, muscle wasting, smooth red tongue, and disorientation)  2. Monitor patient's weight and dietary intake as ordered or per policy  3. Determine patient's food preferences and provide high-protein, high-caloric foods as appropriate  4. Assist patient with eating   5. Allow  adequate time for meals   6. Encourage patient to take dietary supplement as ordered   7. Collaborate with dietitian  8. Include patient/family/caregiver in decisions related to nutrition  Outcome: Progressing  Goal: MOBILITY IS MAINTAINED OR IMPROVED  Description: INTERVENTIONS  1. Collaborate with interdisciplinary team and initiate plan and interventions as ordered (PT/OT)  2. Encourage ambulation  3. Up to chair for meals  4. Monitor for signs of deconditioning  Outcome: Progressing     Problem: Urinary Incontinence  Goal: Perineal skin integrity is maintained or improved  Description: INTERVENTIONS:  1. Assess genitourinary system, perineal skin, labs (urinalysis), and history of incontinence to include past management, aggravating, and alleviating factors  2. Collaborate with interdisciplinary team including wound, ostomy, and continence nurse and initiate plans and interventions as needed  4. Consider urine containment device  5. Apply skin protectant   6. Develop skin care regimen  7. Provide privacy when changing patient's incontinence device to maintain their dignity  Outcome: Progressing     Problem: Safety Adult - Fall  Goal: Free from fall injury  Description: INTERVENTIONS:    Inpatient - Please reference Cares/Safety Flowsheet under [place] Fall Risk for interventions.  Pediatrics - Please reference Peds Daily Cares/Safety Flowsheet under [place] Pediatric Fall Assessment Fall Bundle for interventions  LD/OB - Please reference OB Shift Screening Flowsheet under OB Fall Risk for interventions.  Outcome: Progressing     Problem: Pain - Adult  Goal: Verbalizes/displays adequate comfort level or baseline comfort level  Description: INTERVENTIONS:  1. Encourage patient to monitor pain and request interventions  2. Assess pain using the appropriate pain scale  3. Administer analgesics based on type and severity of pain and evaluate response  4. Educate/Implement non-pharmacological measures as appropriate  and evaluate response  5. Consider cultural, developmental and social influences on pain and pain management  6. Notify Provider if interventions unsuccessful or patient reports new pain  Outcome: Progressing     Problem: Infection - Adult  Goal: Absence of infection during hospitalization  Description: INTERVENTIONS:  1. Assess and monitor for signs and symptoms of infection  2. Monitor lab/diagnostic results  3. Monitor all insertion sites/wounds/incisions  4. Monitor secretions for changes in amount and color  5. Administer medications as ordered  6. Educate and encourage patient and family to use good hand hygiene technique  7. Identify and educate in appropriate isolation precautions for identified infection/condition  Outcome: Progressing     Problem: Safety Adult  Goal: Patient will remain safe during hospitalization  Description: INTERVENTIONS    1. Assess patient for fall risk and implement interventions if needed  2. Use safe transport techniques  3. Assess patient using the appropriate Jarred skin assessment scale  4. Assess patient for risk of aspiration  5. Assess patient for risk of elopement  6. Assess patient for risk of suicide  Outcome: Progressing     Problem: Daily Care  Goal: Daily care needs are met  Description: INTERVENTIONS:   1. Assess and monitor skin integrity  2. Identify patients at risk for skin breakdown on admission and per policy  3. Assess and monitor ability to perform self care and identify potential discharge needs  4. Assess skin integrity/risk for skin breakdown  5. Assist patient with activities of daily living as needed  6. Encourage independent activity per ability   7. Provide mouth care   8. Include patient/family/caregiver in decisions related to daily care   Outcome: Progressing     Problem: Discharge Barriers  Goal: Patient's discharge needs are met  Description: INTERVENTIONS:  1. Assess patient for self-management skills  2. Encourage participation in management  3.  Identify potential discharge barriers on admission and throughout hospital stay  4. Involve patient/family/caregiver in discharge planning process  5. Collaborate with case management/ for discharge needs  Outcome: Progressing     Problem: Safety - Medical Restraint  Goal: Remains free of injury from restraints (Restraint for Interference with Medical Device)  Description: INTERVENTIONS:  1. Determine that other, less restrictive measures have been tried or would not be effective before applying the restraint  2. Evaluate the patient's condition at the time of restraint application  3. Inform patient/family regarding the reason for restraint  4. Monitor safety, psychosocial status, comfort, nutrition and hydration  5. Assess continued need for restraints  6. Identify and implement measures to help patient regain control  Outcome: Progressing     Problem: Mechanical Ventilation  Goal: ACHIEVES OPTIMAL VENTILATION AND OXYGENATION WITH MECHANICAL VENTILATION SUPPORT  Description: INTERVENTIONS:  1. Provide education to patient/family about rationale and expected outcomes associated with mechanical ventilation  2. Position patient to facilitate optimal ventilation/oxygenation status and minimize respiratory effort  3. Position patient to reduce aspiration risk, elevate head of bed 35 degrees or higher, as applicable  4. Assess effectiveness of therapy on ventilation/oxygenation status based on oxygen saturation, ETCO2 monitors and/or blood gases  5 Assess patient for changes in respiratory and physiological status, monitor vital signs  6. Assess patient for changes in mentation and behavior  7. Assess and monitor skin condition, in relation to the respiratory interface  8. Routinely monitor equipment for proper performance and settings  9. Assure equipment alarm volume is adequate for the patient's environment  10. Immediately respond to ventilator alarm(s), to assess patient and/or cause for alarm  11.  Follow ventilator associated events practice standards, as applicable  12. Follow universal infection control and hospital policy(ies)/standards  Outcome: Progressing  Goal: Patient Will Maintain Patent Airway  Description: INTERVENTIONS:  1. Monitor patient for changes in respiratory rate, rhythm, depth and effort  2. Assess and monitor chest excursion  3. Auscultate and monitor breath sounds  4. Monitor changes in SPO2, ETCO2 monitors and/or blood gases  5. Assess and monitor airway secretions and suction as needed  6. Assess and monitor heat/humidity  Outcome: Progressing  Goal: Oral health is maintained or improved  Description: INTERVENTIONS:  1. Assess and monitor condition of skin, lips, tongue, and oral cavity for integrity  2. Perform oral care per hospital standards  3. Suction oral pharynx as needed  Outcome: Progressing  Goal: ET tube will be managed safely  Description: INTERVENTIONS:  1. Assess and monitor the endotracheal tube, to make sure it is secured to patient  2. Assess and monitor insertion depth at teeth, gum or nare  3. Assess and monitor cuff pressure, as applicable  4. Assure manual resuscitation bag, mask, PEEP valve and suction devices are available at bedside  5. Support ventilator circuit to avoid pressure or drag on endotracheal tube  6. Assess and monitor patient for need of restraints  Outcome: Progressing  Goal: Ability to express needs and understand communication  Description: INTERVENTIONS:  1. Assess and monitor patient's ability to understand information and communicate  2. Provide intervention tools, to promote patient's ability to communicate, as applicable  3. Encourage patient to communicate, as applicable  Outcome: Progressing  Goal: Mobility/activity is maintained at optimum level for patient  Description: INTERVENTIONS:  1. Reposition, turn patient per hospital standards  2. Provide range of motion and encourage mobility as indicated  3. Arrange patient bed in chair  position, as applicable  Outcome: Progressing  Goal: Separation from Mechanical Ventilation  Description: INTERVENTIONS:  1. Routinely assess patient for readiness to wean from mechanical ventilation  2. Coordinate daily spontaneous breathing trial with sedation holiday, per hospital policy, to evaluate need for artificial airway  Outcome: Progressing     Problem: PT Misc  Goal: STG - Misc 1  Outcome: Progressing  Goal: STG - Misc 2  Outcome: Progressing     Problem: Balance  Goal: STG - Maintains static sitting balance with upper extremity support  Description: X 5 minutes w/ Mod A  in prep for ADLs at EOB  Outcome: Progressing     Problem: OT Misc  Goal: STG - Misc 1  Description: Pt will follow 1 step commands w/ 75% accuracy in order to complete hand/face hygiene w/ min cues..  Outcome: Progressing

## 2022-06-17 NOTE — PROGRESS NOTES
Critical Care Medicine Progress Note    Assessment/Plan   Lucretia Sands is a 46 y.o. female admitted with Acute respiratory failure with hypoxia and hypercapnia (CMS/[facility]) ([facility]).     * Acute respiratory failure with hypoxia and hypercapnia (CMS/[facility]) ([facility])  Assessment & Plan  Due to an acute exacerbation of COPD-continue therapies as noted.  No evidence of heart failure as TTE completely normal.  No further diuresis needed.  Minimal current oxygen/ventilatory requirements.    Chronic obstructive pulmonary disease with acute exacerbation (CMS/[facility]) ([facility])  Assessment & Plan  Noted to have history of potential end-stage COPD based upon history.  We will need to discuss further with patient to see if she is seeing a pulmonologist and been worked up for this in the past or where this history is specifically coming from.  Nonetheless, she does have evidence of an acute exacerbation for which she is on steroids, antibiotics and scheduled nebs.    Acute kidney injury (CMS/[facility]) ([facility])  Assessment & Plan  Mildly elevated creatinine around 1.4 admission.  Unknown baseline.  Repeat renal function panel this morning pending.    Primary hypertension  Assessment & Plan  Holding home amlodipine and metoprolol XL.      HPI   Patient is a 46-year-old female admitted with an acute exacerbation of COPD with acute hypoxemic and hypercapnic respiratory failure with encephalopathy.  Patient apparently has significant underlying lung disease which was noted to be end-stage and was told that she may need a lung transplant.  Patient was intubated and [place], [place], and transferred to [facility] for further management and care.  Upon arrival patient had difficulty with oxygenation/ventilation thus ventilator was adjusted.  She did require intermittent paralytics to maintain safe ventilation.  Patient also with a potential noted history of CHF.  She had an echocardiogram done on 6/16 that showed normal overall function thus ruling  out heart failure.  Patient did have a CT of the chest that showed evidence of air trapping with potential underlying pneumonitis versus pneumonia.  Patient was treated with antibiotics, steroids and scheduled nebs.  Patient also with likely evidence of OTTONIEL as creatinine was elevated at 1.4 but baseline unknown.      Hospital Course:  6/17: No new or acute events overnight.  Respiratory status improving.  Planning on an SAT/SBT this morning.  Lovenox for VTE prophylaxis.  If unable to be extubated today would start enteral nutrition.  Glucose has been at goal.  If extubated will have patient start to work with PT/OT.  We will continue ceftriaxone and azithromycin for now as respiratory culture still in process.  Continue current lines, drains and tubes.      Review of Systems  Unable to obtain given patient's current medical condition    Vital Signs:  Temp:  [36.1 °C (97 °F)-37.1 °C (98.8 °F)] 36.1 °C (97 °F)  Heart Rate:  [] 75  Resp:  [10-18] 11  SpO2:  [91 %-99 %] 96 %  BP: (103-159)/() 139/96  FiO2 (%):  [30 %-40 %] 30 %  PaO2/FiO2 Ratio:  [169-221] 221  SpO2/FiO2 Ratio Using Measured FiO2 (%):  [227.5-330] 320  Estimated P/F Ratio Using Measured FiO2 (%):  [202.6-285.6] 277.5    Physical Exam   General: Intubated, sedated  CV: Regular rate and rhythm  Respiratory: No respiratory distress or increased work breathing on minimal requirements  Abdomen: Soft, nondistended  Extremities: Minimal edema, warm  Neuro: Does not move to stimuli as deeply sedated, pupils equal round and reactive to light        25 minutes spent in the evaluation and management of patient's current medical condition with greater than 50% of time via face to face with patient or on patient's assigned floor spent in the counseling and coordination of patient's care which included, review of nursing, consultant and other healthcare team documentation, vital signs, medication(s), respiratory intervention(s), current order(s),  procedure result(s), laboratory and microbiologic results and imaging studies as applicable, coordination of care with: multidisciplinary team during MDT rounds.        ADRIANE WOODS MD

## 2022-06-17 NOTE — SUBJECTIVE & OBJECTIVE
Review of Systems  Unable to obtain given patient's current medical condition    Vital Signs:  Temp:  [36.1 °C (97 °F)-37.1 °C (98.8 °F)] 36.1 °C (97 °F)  Heart Rate:  [] 75  Resp:  [10-18] 11  SpO2:  [91 %-99 %] 96 %  BP: (103-159)/() 139/96  FiO2 (%):  [30 %-40 %] 30 %  PaO2/FiO2 Ratio:  [169-221] 221  SpO2/FiO2 Ratio Using Measured FiO2 (%):  [227.5-330] 320  Estimated P/F Ratio Using Measured FiO2 (%):  [202.6-285.6] 277.5    Physical Exam   General: Intubated, sedated  CV: Regular rate and rhythm  Respiratory: No respiratory distress or increased work breathing on minimal requirements  Abdomen: Soft, nondistended  Extremities: Minimal edema, warm  Neuro: Does not move to stimuli as deeply sedated, pupils equal round and reactive to light

## 2022-06-17 NOTE — INTERDISCIPLINARY/THERAPY
Case Management Admission Note    Phone # [phone]    Living Situation: Alone Private residence            ADLs: Independent  Stairs: No    HME/CPAP: None      Oxygen: Yes   Home Baseline Oxygen Flow Rate (L/min): 1 (L/min)  Home Oxygen Use Frequency: Continuous  Oxygen Vendor: [employer]?  Home Health:No     Current Resources: None      Diabetes/supplies: Do you have Diabetes?: No  PCP: Pcp No  Funding: Medicare/[facility]  Pharmacy:[facility] ([facility]) - [place], [address].    Support Person: Primary Emergency Contact: Shelley Jessica, Home Phone: [phone], Mobile Phone: [phone], Relation: Sister  Needs transportation assistance at DC: No     Discharge Needs/Barriers:  medical stability, vent, sedation, steroids, abx  Narrative: CM called pt's sister Shelley to obtain above info. Pt lives alone in [place], [place]. No DME/HH. Pt does have continous home O2; has a concentrator. Shelley thinks pt may have gotten the concentrator through [facility]?. Ride per family. CM following overall progress.  Dispo: DOP

## 2022-06-17 NOTE — ASSESSMENT & PLAN NOTE
Noted to have history of potential end-stage COPD based upon history.  We will need to discuss further with patient to see if she is seeing a pulmonologist and been worked up for this in the past or where this history is specifically coming from.  Nonetheless, she does have evidence of an acute exacerbation for which she is on steroids, antibiotics and scheduled nebs.

## 2022-06-17 NOTE — NURSING END OF SHIFT
"ICU Nursing Shift Summary:    Patient: Lucretia Sands  MRN: [id number]  : 1975, Age: 46 y.o.    Location: [address] [place] [address]    Admit date: 2022  Primary Care Provider: Pcp Roxanna  Attending Provider: Lashon Chinchilla    22    Nursing Goals    Clinical Goals for the Shift: Monitor hemodynamics and oxygenation, ensure comfort and saftey, maintain skin integrity, reorient and redirect prn    Narrative Summary of Progress Towards Clinical Goals\"  Pt reamined hemodynamically stable, O2 remained stable on vent, remained comfortable and safe, skin integrity remained intake, reoriented as needed, pt remained anxious    Nursing Concerns/Thoughts for MD:  No    New Patient or Family Concerns/Issues:  No    Shift Summary:    Major Event(s): increased anxiety     Brief Narratives  · Changes in VS and Rhythm: No   · Changes in Exam: No     Significant Events & Communications to Providers (last 12 hours)     Last 5 Values     Row Name 22 0015                   Provider Notification    Reason for Communication Change in status  anxiety  -BN        Provider Name Gray KHAN              User Key  (r) = Recorded By, (t) = Taken By, (c) = Cosigned By    Initials Name    ILENE Jaime RN            Oxygen Usage (last 12 hours)     Last 5 Values     Row Name 22 1800 22 1841 22 1900 22 1919 22       Oxygen Therapy    SpO2 95 % 95 % 95 % 95 % 95 %    O2 Delivery Interface -- -- Endotracheal tube -- Endotracheal tube    FiO2 (%) 30 % 30 % 30 % 30 % 30 %    Vent Mode -- PC/AC VC/AC VC/AC VC/AC    Row Name 22 2100 22 2200 22 2300 22 2324 22 0000       Oxygen Therapy    SpO2 96 % 97 % 97 % 96 % 96 %    O2 Delivery Interface Endotracheal tube Endotracheal tube Endotracheal tube -- Endotracheal tube    FiO2 (%) 30 % 30 % 30 % 30 % 30 %    Vent Mode VC/AC VC/AC VC/AC VC/AC VC/AC    Row Name 22 0009 22 0100 22 0200 22 0300 22 0353 "       Oxygen Therapy    SpO2 95 % 96 % 97 % 96 % 96 %    O2 Delivery Interface -- Endotracheal tube Endotracheal tube Endotracheal tube --    FiO2 (%) 30 % 30 % 30 % 30 % 30 %    Vent Mode VC/AC VC/AC VC/AC VC/AC VC/AC    Row Name 06/17/22 0400                   Oxygen Therapy    SpO2 96 %        O2 Delivery Interface Endotracheal tube        FiO2 (%) 30 %        Vent Mode VC/AC                Mobility (last 12 hours)     Last 5 Values     Row Name 06/16/22 1800 06/16/22 1900 06/16/22 2000 06/16/22 2100 06/16/22 2200       Mobility    Patient Position -- Supine Supine Supine Supine    Turning Back -- Every 2 hours;Right lateral;Pillow support -- Pillow support;Every 2 hours;Left lateral    Row Name 06/16/22 2300 06/17/22 0000 06/17/22 0100 06/17/22 0200 06/17/22 0300       Mobility    Patient Position Supine Supine Supine Supine Supine    Turning -- Back;Right lateral -- Right lateral;Pillow support;Every 2 hours --    Row Name 06/17/22 0400                   Mobility    Patient Position Supine        Turning Back;Pillow support                  Urethral Catheter     Active Urethral Catheter     Name Placement date Placement time Site Days    Urethral Catheter Latex 16 Fr. 06/16/22  --  -- 1            Active Lines     Active Central venous catheter / Peripherally inserted central catheter / Implantable Port / Hemodialysis catheter / Midline Catheter     None              Infusing Medications   Medication Dose Last Rate   • dexmedetomidine  0.2-1.5 mcg/kg/hr 0.8 mcg/kg/hr (06/17/22 0557)   • fentanyl (PF) 50 mcg/mL IV infusion orderable   mcg/hr 75 mcg/hr (06/16/22 1809)   • propofol  5-50 mcg/kg/min 20 mcg/kg/min (06/17/22 0557)       Current LOC: Intensive Care    Nohemi Jaime RN  06/17/22 5:59 AM

## 2022-06-17 NOTE — NURSING END OF SHIFT
ICU Nursing Shift Summary:    Patient: Lucretia Sands  MRN: [id number]  : 1975, Age: 46 y.o.    Location: [address] [place] [address]    Admit date: 2022  Primary Care Provider: Pcp No  Attending Provider: Lashon Chinchilla    22    Nursing Goals    Clinical Goals for the Shift: Monitor hemodynamics and oxygenation, ensure comfort and saftey, maintain skin integrity, reorient and redirect    Narrative Summary of Progress Towards Clinical Goals:  Pt responds to voice and touch, unable to follow commands.    Nursing Concerns/Thoughts for MD:  No  New Patient or Family Concerns/Issues:  Family is 6 hours away and concerned pt may have sustained brain damage from oxygen loss before transport to .    Shift Summary:    Major Event(s): na    Brief Narratives  · Changes in VS and Rhythm: no   · Changes in Exam: Pt was arouseable during 2nd and 3rd assessment but unable to follow commands     Significant Events & Communications to Providers (last 12 hours)     Last 5 Values    No documentation.             Oxygen Usage (last 12 hours)     Last 5 Values     Row Name 22 0630 22 0657 22 0700 22 0721 22 0827       Oxygen Therapy    SpO2 100 % -- 100 % 99 % 91 %    O2 Delivery Interface -- Endotracheal tube -- -- --    FiO2 (%) 50 % -- 50 % 40 % 40 %    Vent Mode -- -- -- PC/AC PC/AC    Row Name 22 1100 22 1117 22 1200 22 1300 22 1400       Oxygen Therapy    SpO2 97 % 99 % 95 % 96 % 96 %    FiO2 (%) 40 % 30 % 30 % 30 % 30 %    Vent Mode -- PC/AC -- -- --    Row Name 22 1443 22 1500 22 1600 22 1700 22 1800       Oxygen Therapy    SpO2 96 % 96 % 95 % 95 % 95 %    FiO2 (%) 30 % 30 % 30 % 30 % 30 %    Vent Mode PC/AC -- -- -- --            Mobility (last 12 hours)     Last 5 Values     Row Name 22 0700 22 0721 22 1100 22 1400 22 1558       Mobility    Activity Bedrest -- -- -- --    Level of Assistance  Dependent, patient does less than 25% -- -- -- --    Patient Position Supine -- -- -- --    Turning Every 2 hours Right lateral;Pillow support;Every 2 hours Left lateral Right lateral;Pillow support Left lateral;Pillow support;Every 2 hours    Row Name 06/16/22 1800                   Mobility    Turning Back                  Urethral Catheter     Active Urethral Catheter     Name Placement date Placement time Site Days    Urethral Catheter Latex 16 Fr. 06/16/22  --  -- less than 1            Active Lines     Active Central venous catheter / Peripherally inserted central catheter / Implantable Port / Hemodialysis catheter / Midline Catheter     None              Infusing Medications   Medication Dose Last Rate   • fentanyl (PF) 50 mcg/mL IV infusion orderable   mcg/hr 50 mcg/hr (06/16/22 0546)   • propofol  5-50 mcg/kg/min 35 mcg/kg/min (06/16/22 1819)       Current LOC: Intensive Care    Johanna Rahman RN  06/16/22 6:29 PM

## 2022-06-17 NOTE — INTERDISCIPLINARY/THERAPY
06/17/22 1115   General   Missed Treatment Reason   (Attempt visit x 2 this am. At 1029, pt doing SBT. At 1115, RT providing tx.)

## 2022-06-17 NOTE — ASSESSMENT & PLAN NOTE
Due to an acute exacerbation of COPD-continue therapies as noted.  No evidence of heart failure as TTE completely normal.  No further diuresis needed.  Minimal current oxygen/ventilatory requirements.

## 2022-06-17 NOTE — HOSPITAL COURSE
6/17: No new or acute events overnight.  Respiratory status improving.  Planning on an SAT/SBT this morning.  Lovenox for VTE prophylaxis.  If unable to be extubated today would start enteral nutrition.  Glucose has been at goal.  If extubated will have patient start to work with PT/OT.  We will continue ceftriaxone and azithromycin for now as respiratory culture still in process.  Continue current lines, drains and tubes.

## 2022-06-18 PROBLEM — N18.2 CKD (CHRONIC KIDNEY DISEASE) STAGE 2, GFR 60-89 ML/MIN: Chronic | Status: ACTIVE | Noted: 2022-06-18

## 2022-06-18 PROBLEM — J96.11 CHRONIC RESPIRATORY FAILURE WITH HYPOXIA, ON HOME O2 THERAPY (CMS/HCC): Chronic | Status: ACTIVE | Noted: 2022-06-18

## 2022-06-18 PROBLEM — N18.2 CKD (CHRONIC KIDNEY DISEASE) STAGE 2, GFR 60-89 ML/MIN: Status: ACTIVE | Noted: 2022-06-18

## 2022-06-18 PROBLEM — J96.11 CHRONIC RESPIRATORY FAILURE WITH HYPOXIA, ON HOME O2 THERAPY (CMS/HCC): Status: ACTIVE | Noted: 2022-06-18

## 2022-06-18 PROBLEM — Z99.81 CHRONIC RESPIRATORY FAILURE WITH HYPOXIA, ON HOME O2 THERAPY (CMS/HCC): Chronic | Status: ACTIVE | Noted: 2022-06-18

## 2022-06-18 PROBLEM — R30.0 DYSURIA: Status: ACTIVE | Noted: 2022-06-18

## 2022-06-18 PROBLEM — Z99.81 CHRONIC RESPIRATORY FAILURE WITH HYPOXIA, ON HOME O2 THERAPY (CMS/HCC): Status: ACTIVE | Noted: 2022-06-18

## 2022-06-18 LAB
ANION GAP SERPL CALC-SCNC: 8 MMOL/L (ref 3–11)
BACTERIA #/AREA URNS AUTO: ABNORMAL /HPF
BACTERIA ISLT CULT: NORMAL
BACTERIA ISLT CULT: NORMAL
BASOPHILS # BLD AUTO: 0 10*3/UL
BASOPHILS NFR BLD AUTO: 0 % (ref 0–2)
BILIRUB UR QL: NEGATIVE
BUN SERPL-MCNC: 49 MG/DL (ref 7–25)
CA-I BLD-SCNC: 1.37 MMOL/L (ref 1.15–1.33)
CALCIUM SERPL-MCNC: 10.5 MG/DL (ref 8.6–10.3)
CHLORIDE SERPL-SCNC: 102 MMOL/L (ref 98–107)
CLARITY UR: ABNORMAL
CO2 SERPL-SCNC: 32 MMOL/L (ref 21–32)
COLOR UR: YELLOW
CREAT SERPL-MCNC: 0.93 MG/DL (ref 0.6–1.1)
EOSINOPHIL # BLD AUTO: 0 10*3/UL
EOSINOPHIL NFR BLD AUTO: 0 % (ref 0–3)
ERYTHROCYTE [DISTWIDTH] IN BLOOD BY AUTOMATED COUNT: 13.5 % (ref 11.5–14)
GFR SERPL CREATININE-BSD FRML MDRD: 76 ML/MIN/1.73M*2
GLUCOSE BLDC GLUCOMTR-SCNC: 78 MG/DL (ref 70–105)
GLUCOSE BLDC GLUCOMTR-SCNC: 80 MG/DL (ref 70–105)
GLUCOSE SERPL-MCNC: 90 MG/DL (ref 70–105)
GLUCOSE UR QL: NEGATIVE MG/DL
HCT VFR BLD AUTO: 33.3 % (ref 34–45)
HGB BLD-MCNC: 11 G/DL (ref 11.5–15.5)
HGB UR QL: NEGATIVE
KETONES UR-MCNC: NEGATIVE MG/DL
LEUKOCYTE ESTERASE UR QL STRIP: ABNORMAL
LYMPHOCYTES # BLD AUTO: 1.2 10*3/UL
LYMPHOCYTES NFR BLD AUTO: 11 % (ref 11–47)
MCH RBC QN AUTO: 29.6 PG (ref 28–33)
MCHC RBC AUTO-ENTMCNC: 33 G/DL (ref 32–36)
MCV RBC AUTO: 89.7 FL (ref 81–97)
MONOCYTES # BLD AUTO: 0.7 10*3/UL
MONOCYTES NFR BLD AUTO: 7 % (ref 3–11)
NEUTROPHILS # BLD AUTO: 9.2 10*3/UL
NEUTROPHILS NFR BLD AUTO: 82 % (ref 41–81)
NITRITE UR QL: NEGATIVE
PH UR: 7 PH
PLATELET # BLD AUTO: 195 10*3/UL (ref 140–350)
PMV BLD AUTO: 7.7 FL (ref 6.9–10.8)
POTASSIUM SERPL-SCNC: 4.4 MMOL/L (ref 3.5–5.1)
PROT UR STRIP-MCNC: NEGATIVE MG/DL
PTH-INTACT SERPL-MCNC: 233 PG/ML (ref 9–59)
RBC # BLD AUTO: 3.72 10*6/ΜL (ref 3.7–5.3)
RBC #/AREA URNS AUTO: ABNORMAL /HPF
SODIUM SERPL-SCNC: 142 MMOL/L (ref 135–145)
SP GR UR: 1.02 (ref 1–1.03)
SQUAMOUS #/AREA URNS AUTO: ABNORMAL /HPF
UROBILINOGEN UR-MCNC: 0.2 E.U./DL
WBC # BLD AUTO: 11.2 10*3/UL (ref 4.5–10.5)
WBC #/AREA URNS AUTO: ABNORMAL /HPF

## 2022-06-18 PROCEDURE — 94640 AIRWAY INHALATION TREATMENT: CPT

## 2022-06-18 PROCEDURE — 83970 ASSAY OF PARATHORMONE: CPT | Performed by: INTERNAL MEDICINE

## 2022-06-18 PROCEDURE — 82310 ASSAY OF CALCIUM: CPT | Performed by: INTERNAL MEDICINE

## 2022-06-18 PROCEDURE — (BLANK) HC ROOM ICU INTERMEDIATE

## 2022-06-18 PROCEDURE — 81001 URINALYSIS AUTO W/SCOPE: CPT | Performed by: INTERNAL MEDICINE

## 2022-06-18 PROCEDURE — 36415 COLL VENOUS BLD VENIPUNCTURE: CPT | Performed by: INTERNAL MEDICINE

## 2022-06-18 PROCEDURE — 82947 ASSAY GLUCOSE BLOOD QUANT: CPT | Mod: QW

## 2022-06-18 PROCEDURE — 6360000200 HC RX 636 W HCPCS (ALT 250 FOR IP): Performed by: INTERNAL MEDICINE

## 2022-06-18 PROCEDURE — 2590000100 HC RX 259: Performed by: INTERNAL MEDICINE

## 2022-06-18 PROCEDURE — 80047 BASIC METABLC PNL IONIZED CA: CPT | Performed by: INTERNAL MEDICINE

## 2022-06-18 PROCEDURE — 6370000100 HC RX 637 (ALT 250 FOR IP): Performed by: INTERNAL MEDICINE

## 2022-06-18 PROCEDURE — 6370000100 HC RX 637 (ALT 250 FOR IP): Performed by: NURSE PRACTITIONER

## 2022-06-18 PROCEDURE — 2580000300 HC RX 258: Performed by: INTERNAL MEDICINE

## 2022-06-18 PROCEDURE — 99233 SBSQ HOSP IP/OBS HIGH 50: CPT | Performed by: INTERNAL MEDICINE

## 2022-06-18 PROCEDURE — 85025 COMPLETE CBC W/AUTO DIFF WBC: CPT | Performed by: INTERNAL MEDICINE

## 2022-06-18 RX ORDER — AMLODIPINE BESYLATE 5 MG/1
5 TABLET ORAL DAILY
Status: DISCONTINUED | OUTPATIENT
Start: 2022-06-18 | End: 2022-06-20

## 2022-06-18 RX ORDER — METOPROLOL TARTRATE 25 MG/1
12.5 TABLET, FILM COATED ORAL 2 TIMES DAILY
Status: DISCONTINUED | OUTPATIENT
Start: 2022-06-18 | End: 2022-06-20

## 2022-06-18 RX ORDER — PREDNISONE 20 MG/1
40 TABLET ORAL DAILY
Status: DISCONTINUED | OUTPATIENT
Start: 2022-06-19 | End: 2022-06-20

## 2022-06-18 RX ORDER — AZITHROMYCIN 250 MG/1
500 TABLET, FILM COATED ORAL DAILY
Status: DISCONTINUED | OUTPATIENT
Start: 2022-06-19 | End: 2022-06-20

## 2022-06-18 RX ADMIN — ACETAMINOPHEN 650 MG: 325 TABLET ORAL at 00:27

## 2022-06-18 RX ADMIN — IPRATROPIUM BROMIDE 0.5 MG: 0.5 SOLUTION RESPIRATORY (INHALATION) at 07:19

## 2022-06-18 RX ADMIN — CEFTRIAXONE SODIUM 1000 MG: 1 INJECTION, POWDER, FOR SOLUTION INTRAMUSCULAR; INTRAVENOUS at 05:08

## 2022-06-18 RX ADMIN — LEVALBUTEROL HYDROCHLORIDE 1.25 MG: 1.25 SOLUTION, CONCENTRATE RESPIRATORY (INHALATION) at 15:15

## 2022-06-18 RX ADMIN — SENNOSIDES 17.2 MG: 8.6 TABLET, FILM COATED ORAL at 21:15

## 2022-06-18 RX ADMIN — GUAIFENESIN 600 MG: 600 TABLET, EXTENDED RELEASE ORAL at 21:15

## 2022-06-18 RX ADMIN — ENOXAPARIN SODIUM 40 MG: 100 INJECTION SUBCUTANEOUS at 18:16

## 2022-06-18 RX ADMIN — LEVALBUTEROL HYDROCHLORIDE 1.25 MG: 1.25 SOLUTION, CONCENTRATE RESPIRATORY (INHALATION) at 19:58

## 2022-06-18 RX ADMIN — IPRATROPIUM BROMIDE 0.5 MG: 0.5 SOLUTION RESPIRATORY (INHALATION) at 19:58

## 2022-06-18 RX ADMIN — IPRATROPIUM BROMIDE 0.5 MG: 0.5 SOLUTION RESPIRATORY (INHALATION) at 15:15

## 2022-06-18 RX ADMIN — AZITHROMYCIN MONOHYDRATE 500 MG: 500 INJECTION, POWDER, LYOPHILIZED, FOR SOLUTION INTRAVENOUS at 06:04

## 2022-06-18 RX ADMIN — LEVALBUTEROL HYDROCHLORIDE 1.25 MG: 1.25 SOLUTION, CONCENTRATE RESPIRATORY (INHALATION) at 07:19

## 2022-06-18 RX ADMIN — METHYLPREDNISOLONE SODIUM SUCCINATE 40 MG: 40 INJECTION, POWDER, FOR SOLUTION INTRAMUSCULAR; INTRAVENOUS at 09:07

## 2022-06-18 RX ADMIN — METOPROLOL TARTRATE 12.5 MG: 25 TABLET, FILM COATED ORAL at 21:15

## 2022-06-18 RX ADMIN — ACETAMINOPHEN 650 MG: 325 TABLET ORAL at 07:41

## 2022-06-18 RX ADMIN — GUAIFENESIN 600 MG: 600 TABLET, EXTENDED RELEASE ORAL at 09:07

## 2022-06-18 NOTE — NURSING NOTE
Patient transferred to 621 on 1L nasal canula, in wheelchair with transport monitior. Nurse Iron at bedside receiving patient,  all questions answered.

## 2022-06-18 NOTE — PLAN OF CARE
Problem: Infection Control  Goal: MINIMIZE THE ACQUISITION AND TRANSMISSION OF INFECTIOUS AGENTS  Description: INTERVENTIONS:  1. Isolate patient with suspected/diagnosed communicable disease  2. Place on designated isolation precautions  3. Maintain isolation techniques  4. Perform hand hygiene before and after each patient care activity  5. [place] universal precautions  6. Wear PPE as directed for type of isolation  7. Administer antibiotic therapy as ordered  8. Clean the environment appropriately after each patient use  9. Clean patient care equipment after each patient use as it leaves the room  10. Limit number of visitors, as appropriate  6/18/2022 0117 by Katharine Solorio RN  Outcome: Progressing  6/18/2022 0117 by Katharine Solorio RN  Outcome: Progressing     Problem: Knowledge Deficit  Goal: Patient/family/caregiver demonstrates understanding of disease process, treatment plan, medications, and discharge instructions  Description: INTERVENTIONS:   1. Complete learning assessment and assess knowledge base  2. Provide teaching at level of understanding   3. Provide teaching via preferred learning methods  6/18/2022 0117 by Katharine Solorio RN  Outcome: Progressing  6/18/2022 0117 by Katharine Solorio RN  Outcome: Progressing     Problem: Potential for Compromised Skin Integrity  Goal: Skin Integrity is Maintained or Improved  Description: INTERVENTIONS:  1. Assess and monitor skin integrity  2. Collaborate with interdisciplinary team and initiate plans and interventions as needed  3. Alternate a full bath with partial baths for elderly   4. Monitor patient's hygiene practices   5. Collaborate with wound, ostomy, and continence nurse  6/18/2022 0117 by Katharine Solorio RN  Outcome: Progressing  6/18/2022 0117 by Katharine Solorio RN  Outcome: Progressing  Goal: Nutritional status is improving  Description: INTERVENTIONS:  1. Monitor and assess patient for malnutrition (ex- brittle hair, bruises, dry  skin, pale skin and conjunctiva, muscle wasting, smooth red tongue, and disorientation)  2. Monitor patient's weight and dietary intake as ordered or per policy  3. Determine patient's food preferences and provide high-protein, high-caloric foods as appropriate  4. Assist patient with eating   5. Allow adequate time for meals   6. Encourage patient to take dietary supplement as ordered   7. Collaborate with dietitian  8. Include patient/family/caregiver in decisions related to nutrition  6/18/2022 0117 by Katharine Solorio RN  Outcome: Progressing  6/18/2022 0117 by Katharine Solorio RN  Outcome: Progressing  Goal: MOBILITY IS MAINTAINED OR IMPROVED  Description: INTERVENTIONS  1. Collaborate with interdisciplinary team and initiate plan and interventions as ordered (PT/OT)  2. Encourage ambulation  3. Up to chair for meals  4. Monitor for signs of deconditioning  6/18/2022 0117 by Katharine Solorio RN  Outcome: Progressing  6/18/2022 0117 by Katharine Solorio RN  Outcome: Progressing     Problem: Urinary Incontinence  Goal: Perineal skin integrity is maintained or improved  Description: INTERVENTIONS:  1. Assess genitourinary system, perineal skin, labs (urinalysis), and history of incontinence to include past management, aggravating, and alleviating factors  2. Collaborate with interdisciplinary team including wound, ostomy, and continence nurse and initiate plans and interventions as needed  4. Consider urine containment device  5. Apply skin protectant   6. Develop skin care regimen  7. Provide privacy when changing patient's incontinence device to maintain their dignity  6/18/2022 0117 by Katharine Solorio RN  Outcome: Progressing  6/18/2022 0117 by Katharine Solorio RN  Outcome: Progressing     Problem: Safety Adult - Fall  Goal: Free from fall injury  Description: INTERVENTIONS:    Inpatient - Please reference Cares/Safety Flowsheet under [place] Fall Risk for interventions.  Pediatrics - Please reference Peds  Daily Cares/Safety Flowsheet under [place] Pediatric Fall Assessment Fall Bundle for interventions  LD/OB - Please reference OB Shift Screening Flowsheet under OB Fall Risk for interventions.  6/18/2022 0117 by Katharine Solorio RN  Outcome: Progressing  6/18/2022 0117 by Katharine Solorio RN  Outcome: Progressing     Problem: Pain - Adult  Goal: Verbalizes/displays adequate comfort level or baseline comfort level  Description: INTERVENTIONS:  1. Encourage patient to monitor pain and request interventions  2. Assess pain using the appropriate pain scale  3. Administer analgesics based on type and severity of pain and evaluate response  4. Educate/Implement non-pharmacological measures as appropriate and evaluate response  5. Consider cultural, developmental and social influences on pain and pain management  6. Notify Provider if interventions unsuccessful or patient reports new pain  6/18/2022 0117 by Katharine Solorio RN  Outcome: Progressing  6/18/2022 0117 by Katharine Solorio RN  Outcome: Progressing     Problem: Infection - Adult  Goal: Absence of infection during hospitalization  Description: INTERVENTIONS:  1. Assess and monitor for signs and symptoms of infection  2. Monitor lab/diagnostic results  3. Monitor all insertion sites/wounds/incisions  4. Monitor secretions for changes in amount and color  5. Administer medications as ordered  6. Educate and encourage patient and family to use good hand hygiene technique  7. Identify and educate in appropriate isolation precautions for identified infection/condition  6/18/2022 0117 by Katharine Solorio RN  Outcome: Progressing  6/18/2022 0117 by Katharine Solorio RN  Outcome: Progressing     Problem: Safety Adult  Goal: Patient will remain safe during hospitalization  Description: INTERVENTIONS    1. Assess patient for fall risk and implement interventions if needed  2. Use safe transport techniques  3. Assess patient using the appropriate Jarred skin assessment  scale  4. Assess patient for risk of aspiration  5. Assess patient for risk of elopement  6. Assess patient for risk of suicide  6/18/2022 0117 by Katharine Solorio RN  Outcome: Progressing  6/18/2022 0117 by Katharine Solorio RN  Outcome: Progressing     Problem: Daily Care  Goal: Daily care needs are met  Description: INTERVENTIONS:   1. Assess and monitor skin integrity  2. Identify patients at risk for skin breakdown on admission and per policy  3. Assess and monitor ability to perform self care and identify potential discharge needs  4. Assess skin integrity/risk for skin breakdown  5. Assist patient with activities of daily living as needed  6. Encourage independent activity per ability   7. Provide mouth care   8. Include patient/family/caregiver in decisions related to daily care   6/18/2022 0117 by Katharine Solorio RN  Outcome: Progressing  6/18/2022 0117 by Katharine Solorio RN  Outcome: Progressing     Problem: Discharge Barriers  Goal: Patient's discharge needs are met  Description: INTERVENTIONS:  1. Assess patient for self-management skills  2. Encourage participation in management  3. Identify potential discharge barriers on admission and throughout hospital stay  4. Involve patient/family/caregiver in discharge planning process  5. Collaborate with case management/ for discharge needs  6/18/2022 0117 by Katharine Solorio RN  Outcome: Progressing  6/18/2022 0117 by Katharine Solorio RN  Outcome: Progressing     Problem: Safety - Medical Restraint  Goal: Remains free of injury from restraints (Restraint for Interference with Medical Device)  Description: INTERVENTIONS:  1. Determine that other, less restrictive measures have been tried or would not be effective before applying the restraint  2. Evaluate the patient's condition at the time of restraint application  3. Inform patient/family regarding the reason for restraint  4. Monitor safety, psychosocial status, comfort, nutrition and  hydration  5. Assess continued need for restraints  6. Identify and implement measures to help patient regain control  6/18/2022 0117 by Katharine Solorio RN  Outcome: Progressing  6/18/2022 0117 by Katharine Solorio RN  Outcome: Progressing     Problem: PT Misc  Goal: STG - Misc 1  6/18/2022 0117 by Katharine Solorio RN  Outcome: Progressing  6/18/2022 0117 by Katharine Solorio RN  Outcome: Progressing  Goal: STG - Misc 2  6/18/2022 0117 by Katharine Solorio RN  Outcome: Progressing  6/18/2022 0117 by Katharine Solorio RN  Outcome: Progressing     Problem: Balance  Goal: STG - Maintains static sitting balance with upper extremity support  Description: X 5 minutes w/ Mod A  in prep for ADLs at EOB  6/18/2022 0117 by Katharine Solorio RN  Outcome: Progressing  6/18/2022 0117 by Katharine Solorio RN  Outcome: Progressing     Problem: OT Misc  Goal: STG - Misc 1  Description: Pt will follow 1 step commands w/ 75% accuracy in order to complete hand/face hygiene w/ min cues..  6/18/2022 0117 by Katharine Solorio RN  Outcome: Progressing  6/18/2022 0117 by Katharine Solorio RN  Outcome: Progressing     Problem: Respiratory - Adult  Goal: Achieves optimal ventilation and oxygenation  Description: INTERVENTIONS:  1. Assess for changes in respiratory status  2. Assess for changes in mentation and behavior  3. Position to facilitate oxygenation and minimize respiratory effort  4. Oxygen supplementation based on oxygen saturation or ABGs  5. Assess patient's ability to cough effectively  6. Encourage broncho-pulmonary hygiene including cough, deep breathe  7. Assess the need for suctioning   8. Assess and instruct to report SOB or any respiratory difficulty  9. Respiratory Therapy support as indicated, including medications and treatment.  6/18/2022 0117 by Katharine Solorio RN  Outcome: Progressing  6/18/2022 0117 by Katharine Solorio RN  Outcome: Progressing

## 2022-06-18 NOTE — NURSING END OF SHIFT
ICU Nursing Shift Summary:    Patient: Lucretia Sands  MRN: [id number]  : 1975, Age: 46 y.o.    Location: [address] [place] [address]    Admit date: 2022  Primary Care Provider: Pcp Roxanna  Attending Provider: Lashon Chinchilla    22    Nursing Goals    Clinical Goals for the Shift: SAT/SBT, stable VS    Narrative Summary of Progress Towards Clinical Goals:  Patient extubated this am at 1100, currently on 1L which is baseline per patient. A&Ox4, VSS other than hypertension. Provider restarted home antihypertensives and ordered prn hydralazine. Patient is concerned of an ear infection - antibiotic coverage should take care of this potential issue.     Nursing Concerns/Thoughts for MD:  Yes - hypertension    New Patient or Family Concerns/Issues:  No    Shift Summary:    Major Event(s): none    Brief Narratives  · Changes in VS and Rhythm: Yes - see flowsheets   · Changes in Exam: Yes - see flowsheets     Significant Events & Communications to Providers (last 12 hours)     Last 5 Values    No documentation.             Oxygen Usage (last 12 hours)     Last 5 Values     Row Name 22 0700 22 0710 22 0726 22 0727 22 0730       Oxygen Therapy    SpO2 96 % 97 % -- 97 % 93 %    FiO2 (%) 30 % 30 % -- 30 % 30 %    Vent Mode -- -- VC/AC -- --    Row Name 22 0800 22 0900 22 0930 22 1000 22 1013       Oxygen Therapy    SpO2 96 % 96 % 97 % 94 % 94 %    O2 Delivery Interface Endotracheal tube -- -- -- --    FiO2 (%) 30 % 30 % 30 % 30 % 30 %    Vent Mode VC/AC -- -- -- SPN-CPAP/PS    Row Name 22 1100 22 1105 22 1200 22 1300 22 1400       Oxygen Therapy    SpO2 97 % 90 % 96 % 94 % 92 %    O2 Delivery Interface -- Nasal cannula Nasal cannula -- --    O2 Flow Rate (L/min) -- 2 L/min 1 L/min -- --    FiO2 (%) 30 % -- -- -- --    Row Name 22 1500 22 1600 22 1700             Oxygen Therapy    SpO2 95 % 95 % 93 %      O2 Delivery  Interface -- Nasal cannula --      O2 Flow Rate (L/min) -- 1 L/min --      FiO2 (%) -- -- --              Mobility (last 12 hours)     Last 5 Values     Row Name 06/17/22 0800 06/17/22 1200 06/17/22 1600             Mobility    Patient Position Supine Sitting Supine      Turning Back;Pillow support Turns self Turns self                Urethral Catheter     Active Urethral Catheter     Name Placement date Placement time Site Days    Urethral Catheter Latex 16 Fr. 06/16/22  --  -- 1            Active Lines     Active Central venous catheter / Peripherally inserted central catheter / Implantable Port / Hemodialysis catheter / Midline Catheter     None              Infusing Medications   Medication Dose Last Rate       Current LOC: Intensive Care    Sabrina Wheeler RN  06/17/22 6:03 PM

## 2022-06-18 NOTE — MEDICATION HISTORY SPECIALIST NOTES
[facility] [id number]    Patient is unable to verify medications, Medication list has been updated per resources available.  Per Medication History policy patient will be closed out, if Medication History is needed in the future please call [phone].  Patient is being marked Pharmacy Complete to remove off Med History list.

## 2022-06-18 NOTE — PLAN OF CARE
Problem: Infection Control  Goal: MINIMIZE THE ACQUISITION AND TRANSMISSION OF INFECTIOUS AGENTS  Description: INTERVENTIONS:  1. Isolate patient with suspected/diagnosed communicable disease  2. Place on designated isolation precautions  3. Maintain isolation techniques  4. Perform hand hygiene before and after each patient care activity  5. [place] universal precautions  6. Wear PPE as directed for type of isolation  7. Administer antibiotic therapy as ordered  8. Clean the environment appropriately after each patient use  9. Clean patient care equipment after each patient use as it leaves the room  10. Limit number of visitors, as appropriate  Outcome: Progressing     Problem: Potential for Compromised Skin Integrity  Goal: Skin Integrity is Maintained or Improved  Description: INTERVENTIONS:  1. Assess and monitor skin integrity  2. Collaborate with interdisciplinary team and initiate plans and interventions as needed  3. Alternate a full bath with partial baths for elderly   4. Monitor patient's hygiene practices   5. Collaborate with wound, ostomy, and continence nurse  Outcome: Progressing     Problem: Safety Adult - Fall  Goal: Free from fall injury  Description: INTERVENTIONS:    Inpatient - Please reference Cares/Safety Flowsheet under [place] Fall Risk for interventions.  Pediatrics - Please reference Peds Daily Cares/Safety Flowsheet under [place] Pediatric Fall Assessment Fall Bundle for interventions  LD/OB - Please reference OB Shift Screening Flowsheet under OB Fall Risk for interventions.  Outcome: Progressing     Problem: Pain - Adult  Goal: Verbalizes/displays adequate comfort level or baseline comfort level  Description: INTERVENTIONS:  1. Encourage patient to monitor pain and request interventions  2. Assess pain using the appropriate pain scale  3. Administer analgesics based on type and severity of pain and evaluate response  4. Educate/Implement non-pharmacological measures as appropriate  and evaluate response  5. Consider cultural, developmental and social influences on pain and pain management  6. Notify Provider if interventions unsuccessful or patient reports new pain  Outcome: Progressing

## 2022-06-18 NOTE — INTERDISCIPLINARY/THERAPY
followed-up with pt. during rounds.   Offered supportive presence and prayer.   Spiritual Care Services ([facility]) remain available.

## 2022-06-18 NOTE — PLAN OF CARE
Problem: Respiratory - Adult  Goal: Achieves optimal ventilation and oxygenation  Description: INTERVENTIONS:  1. Assess for changes in respiratory status  2. Assess for changes in mentation and behavior  3. Position to facilitate oxygenation and minimize respiratory effort  4. Oxygen supplementation based on oxygen saturation or ABGs  5. Assess patient's ability to cough effectively  6. Encourage broncho-pulmonary hygiene including cough, deep breathe  7. Assess the need for suctioning   8. Assess and instruct to report SOB or any respiratory difficulty  9. Respiratory Therapy support as indicated, including medications and treatment.  Outcome: Progressing

## 2022-06-18 NOTE — PLAN OF CARE
Problem: Mechanical Ventilation  Goal: ACHIEVES OPTIMAL VENTILATION AND OXYGENATION WITH MECHANICAL VENTILATION SUPPORT  Description: INTERVENTIONS:  1. Provide education to patient/family about rationale and expected outcomes associated with mechanical ventilation  2. Position patient to facilitate optimal ventilation/oxygenation status and minimize respiratory effort  3. Position patient to reduce aspiration risk, elevate head of bed 35 degrees or higher, as applicable  4. Assess effectiveness of therapy on ventilation/oxygenation status based on oxygen saturation, ETCO2 monitors and/or blood gases  5 Assess patient for changes in respiratory and physiological status, monitor vital signs  6. Assess patient for changes in mentation and behavior  7. Assess and monitor skin condition, in relation to the respiratory interface  8. Routinely monitor equipment for proper performance and settings  9. Assure equipment alarm volume is adequate for the patient's environment  10. Immediately respond to ventilator alarm(s), to assess patient and/or cause for alarm  11. Follow ventilator associated events practice standards, as applicable  12. Follow universal infection control and hospital policy(ies)/standards  Outcome: Completed  Goal: Patient Will Maintain Patent Airway  Description: INTERVENTIONS:  1. Monitor patient for changes in respiratory rate, rhythm, depth and effort  2. Assess and monitor chest excursion  3. Auscultate and monitor breath sounds  4. Monitor changes in SPO2, ETCO2 monitors and/or blood gases  5. Assess and monitor airway secretions and suction as needed  6. Assess and monitor heat/humidity  Outcome: Completed  Goal: Oral health is maintained or improved  Description: INTERVENTIONS:  1. Assess and monitor condition of skin, lips, tongue, and oral cavity for integrity  2. Perform oral care per hospital standards  3. Suction oral pharynx as needed  Outcome: Completed  Goal: ET tube will be managed  safely  Description: INTERVENTIONS:  1. Assess and monitor the endotracheal tube, to make sure it is secured to patient  2. Assess and monitor insertion depth at teeth, gum or nare  3. Assess and monitor cuff pressure, as applicable  4. Assure manual resuscitation bag, mask, PEEP valve and suction devices are available at bedside  5. Support ventilator circuit to avoid pressure or drag on endotracheal tube  6. Assess and monitor patient for need of restraints  Outcome: Completed  Goal: Ability to express needs and understand communication  Description: INTERVENTIONS:  1. Assess and monitor patient's ability to understand information and communicate  2. Provide intervention tools, to promote patient's ability to communicate, as applicable  3. Encourage patient to communicate, as applicable  Outcome: Completed  Goal: Mobility/activity is maintained at optimum level for patient  Description: INTERVENTIONS:  1. Reposition, turn patient per hospital standards  2. Provide range of motion and encourage mobility as indicated  3. Arrange patient bed in chair position, as applicable  Outcome: Completed  Goal: Separation from Mechanical Ventilation  Description: INTERVENTIONS:  1. Routinely assess patient for readiness to wean from mechanical ventilation  2. Coordinate daily spontaneous breathing trial with sedation holiday, per hospital policy, to evaluate need for artificial airway  Outcome: Completed     Problem: Respiratory - Adult  Goal: Achieves optimal ventilation and oxygenation  Description: INTERVENTIONS:  1. Assess for changes in respiratory status  2. Assess for changes in mentation and behavior  3. Position to facilitate oxygenation and minimize respiratory effort  4. Oxygen supplementation based on oxygen saturation or ABGs  5. Assess patient's ability to cough effectively  6. Encourage broncho-pulmonary hygiene including cough, deep breathe  7. Assess the need for suctioning   8. Assess and instruct to report  SOB or any respiratory difficulty  9. Respiratory Therapy support as indicated, including medications and treatment.  Outcome: Progressing

## 2022-06-18 NOTE — PROGRESS NOTES
"  [facility]    (formerly [facility])  [address] [place] [address]        Hospitalist/Internal Medicine - Progress Note        Lucretia Sands  :  1975   Age: 46 y.o.  MRN: [id number]   Code Status: Full Code  Primary Care Physician:  Pcp No   Admit Date:  2022    Date of Service: 2022     LOS: 2  Admitting Diagnosis: Chronic obstructive pulmonary disease with acute exacerbation (CMS/HCC) (HCC)      Patient Summary: Lucretia Sands is a 46 y.o. female with a PMHx of depression, anxiety, HTN, CKDz stage II and 1 LPM O2-dependent end-stage COPD who noted onset of \"puffy eyes\" on 2022.  Over the next few days she then developed a cough with clear phlegm production for which she took Mucinex, Tessalon and acetaminophen.  She continued her usual 1 LPM home O2 and nebulizer regimen.  By 6/15/2022 she began feeling worse and had increasing cough and SOB for which she initially presented to the [facility] in [place], [place] where she was in severe respiratory distress and became unarousable.  She was intubated prior to transport to this facility.  She was also hypotensive for which a Levophed gtt was started prior to transfer.  Need for higher level of care prompted air transport to our ED from where patient was subsequently admitted to the ICU on 2022 for further eval & Tx.  She was subsequently extubated and transferred to the medical floor where the Hospitalist service assumed care on 2022.    Pertinent pulmonary history obtained from patient (no records available for review): Unclear etiology of end-stage of COPD.  No smoking Hx or inhalational injury Hx.  Had \"chronic pneumonia\" as a child.  Sees Dr. Perales ([facility] Pulmonologist) in [place], [place].  Was told she needs a lung transplant.  Referred to [facility]; states she was told that she did not yet need a lung transplant and was referred to Pulmonary Rehab " "which she stopped after months since that she did not notice any benefit.  Previous severe exacerbation episode requiring intubation/mechanical ventilation in 2020 in [place], [place].      Hospital Problem List  Active Hospital Problems    Diagnosis Date Noted   • *Chronic obstructive pulmonary disease with acute exacerbation (CMS/[facility]) ([facility]) 06/16/2022     Priority: 1 - High   • Acute respiratory failure with hypoxia and hypercapnia (CMS/[facility]) ([facility]) 06/16/2022     Priority: 1 - High   • Chronic respiratory failure with hypoxia, on home O2 therapy (CMS/[facility]) ([facility]) 06/18/2022     Priority: 2      1 LPM baseline home O2     • Hypotension      Priority: 2    • Dysuria 06/18/2022   • CKD (chronic kidney disease) stage 2, GFR 60-89 ml/min 06/18/2022   • Hypertension, essential    • Hypercalcemia    • Acute kidney injury (CMS/[facility]) ([facility]) 06/16/2022      Resolved Hospital Problems         Assessment & Plan  #Acute exacerbation of O2-dependent end-stage COPD  Pertinent pulmonary history obtained from patient (no records available for review): Unclear etiology of end-stage of COPD.  No smoking Hx or inhalational injury Hx.  Had \"chronic pneumonia\" as a child.  Sees Dr. Perales ([facility] Pulmonologist) in [place], [place].  Was told she needs a lung transplant.  Referred to [facility]; states she was told that she did not yet need a lung transplant and was referred to Pulmonary Rehab which she stopped after months since that she did not notice any benefit.  Previous severe exacerbation episode requiring intubation/mechanical ventilation in 2020 in [place], [place].  6/15/2022 CT thorax w/contrast at outside facility negative for definitive focal infection   -New nodularity in peripheral segment of lateral RML   -Mosaic groundglass appearance of lungs increased from 5/23/2022   -Small right and trace left pleural effusions noted.   -Small areas of bilateral nodularity unchanged on 5/23/2022  CT C/A/P 6/16/2020 with similar " "findings; no obvious focal infection  Respiratory pathogen panel & COVID-19 PCR negative  Sputum culture & endotracheal culture negative  Blood culture NGTD  Empiric ceftriaxone & azithromycin started on admit (azithromycin included primarily for anti-inflammatory benefit)   -D/C ceftriaxone (received 2 days) as no clear-cut bacterial infection identified   -Change azithromycin from IV to oral  Continue scheduled ipratropium/levalbuterol neb 3 times daily & PRN nebs as well  D/C IV methylprednisolone 40 Mg daily; transition to oral prednisone 6/19/2022  Continue scheduled guaifenesin  Assess ongoing O2 needs; currently at home baseline of 1 LPM    #Acute respiratory failure with hypoxia & hypercapnia  #Chronic respiratory failure with hypoxia on home O2  Due to, eval & Tx per COPD discussion  Baseline home O2 1 LPM  Assess ongoing O2 needs    #BNP elevation  Likely due to COPD exacerbation   -Do not suspect CHF as previously documented  Echo 6/16/2022 unremarkable.  Summary:  -LV normal size, thickness, wall motion and systolic/diastolic function with LVEF 73%  -RV normal size and systolic function  -LA mildly dilated.  RV normal size  Defer further cardiac eval    #Hypotension  Significant hypotension prompted starting Levophed gtt prior to departing [place], [place] facility  -Specifics unknown if BP occurred primarily or after receiving sedation/paralytics for elevation/mechanical ventilation  Hypotension resolving  See hypertension discussion    #HTN, essential  Initially hypotensive at [place], [place] facility requiring Levophed gtt; see that discussion  Very variable BPs   -Accelerated BP up to max 195/117 on 6/17/2022   -Subsequent low BP at 81/50  Current BPs 102-134 mmHg systolic with occasional \"soft\" BP at 95/56  Continue usual home amlodipine 5 Mg daily   -hold parameter for SBP <110 mmHg  Continue metoprolol XL at 12.5 Mg twice daily (lower than usual home dose of 25 Mg twice daily)   -Hold parameters for SBP " <100 mmHg or heart rate <60 bpm  Monitor BP    #Hypercalcemia  Admit calcium 10.9  Current calcium 11.2 (corrected calcium 11.1)  Check ionized calcium & PTH for further evaluation  Follow labs    #OTTONIEL on CKD stage II  Baseline renal function/lab values unknown  Admit creatinine 1.37  Current creatinine normalized at 0.93 after IV fluids earlier this admission; IV fluids DC'd  Follow labs    #Dysuria  Slight dysuria immediately after Buitrago catheter removal  Check screening U/A  Already received ABX per COPD exacerbation discussion    #DVT Prophylaxis:   Enoxaparin      Discussed with RN        Consultants:  CONSULT SEPSIS COORDINATOR  CONSULT TO INTENSIVIST  CONSULT TO SPIRITUAL CARE      Procedures:        Certification: Provider Inpatient Hospital Services Certification   Awaiting specific diagnostic testing and/or results that can impact the therapeutic care plan: Yes (6/18/2022  7:18 AM)  Current therapeutic care plan can only be provided in the hospital: Yes (6/18/2022  7:18 AM)  Current anti-infective care plan requires IV antibiotics that currently can only be gien in the inpatient setting: Yes (6/18/2022  7:18 AM)  Discharge expected in : Other (comments) (To be determined) (6/18/2022  7:18 AM)  Predicted/Planned discharge to: other (comments) (To be determined) (6/18/2022  7:18 AM)      Disposition:        This document was created with the assistance of voice recognition software. I have reviewed the documentation and affirm that it represents information collected by myself upon interviewing and examining the patient, the medical record and other available sources of information. While I have reviewed the content for accuracy, some typographical and dictation errors may exist.  Davidson Rubio MD 6/18/2022 8:32 AM  ___________________________________________________________________  Interval History: Feeling/breathing better today, but still more SOB than baseline.  Nonproductive cough persists but is  improving.  Denies pain with breathing.  Notes some slight burning immediately after Buitrago catheter removal.  Denies chest pain or palpitations.  No abdominal pain or N/V.    Allergies and labs reviewed  Scheduled Meds:amLODIPine, 5 mg, oral, Daily  metoprolol tartrate, 12.5 mg, oral, 2x daily  ipratropium, 0.5 mg, nebulization, 3x daily  levalbuterol, 1.25 mg, nebulization, 3x daily  guaiFENesin, 600 mg, oral, 2x daily  Nozin Nasal Antiseptic POPSwab, 1 application, nasal, 2x daily  senna, 17.2 mg, oral, Nightly  enoxaparin, 40 mg, subcutaneous, Daily at 1400  cefTRIAXone, 1,000 mg, intravenous, q24h  azithromycin, 500 mg, intravenous, q24h  methylPREDNISolone sodium succinate, 40 mg, intravenous, q24h ELIZABET        Objective   Temp:  [36.4 °C (97.5 °F)-37.1 °C (98.7 °F)] 36.8 °C (98.3 °F)  Heart Rate:  [57-92] 66  Resp:  [11-44] 18  SpO2:  [90 %-100 %] 98 %  BP: ()/() 98/61  FiO2 (%):  [30 %] 30 %  SpO2/FiO2 Ratio Using Measured FiO2 (%):  [313.3-323.3] 323.3  SpO2/FiO2 Ratio Using Approximate FiO2 (%):  [321.4-416.7] 408.3  Estimated P/F Ratio Using Approximate FiO2 (%):  [278.6-355.8] 349  Estimated P/F Ratio Using Measured FiO2 (%):  [272.1-280.2] 280.2    Intake/Output last 3 shifts:  I/O last 3 completed shifts:  In: 1383 [P.O.:435; I.V.:345.1; NG/GT:120; IV Piggyback:483]  Out: 3620 [Urine:3620]  Intake/Output this shift:  I/O this shift:  In: -   Out: 600 [Urine:600]    Constitutional  General Appearance:  Comfortable  Orientation:  Findings of: Alert and oriented x3    Skin  Skin:  Findings of: Normal Intact, Warm & Dry    HEENT  Head:  Normocephalic/Atraumatic  Eyes:  Findings of: Conjunctivae Clear, EOM Intact    Negative for: Scleral Icterus    Respiratory  Respiratory Effort:  Findings of: Normal    Negative for: Labored  Auscultation:  Findings of: Slightly coarse breath sounds bilateral bases.  Trivial wheezing at bilateral bases only with forced expiration.  Upper lung fields clear to  Auscultation    Negative for: Crackles/Rales, Rhonchi, E-A changes, Dullness to percussion    Cardiovascular  Heart Rate:  Regular Rate  Rhythm:  Regular  Heart Sounds:  Normal: S1, S2  Abnormal Heart Sounds:  Negative for: Murmur, Rub, S3 gallop, S4 gallop    Abdomen  Abdomen:  Findings of: Normal Appearance, Soft, Non-Tender    Negative for: Distension, Hepatosplenomegaly    Genitourinary  Urinary Catheter Present?:  No    Extremities/Musculoskeletal  Extremities:  Findings of: Palpable Pedal Pulses    Negative for: Clubbing, Cyanosis, Edema    Neurological  Responsiveness:  Awake, Alert  Cranial Nerves II-XII Intact:  Yes    Psychiatric  Cognition:  Findings of: Mood/Affect WNL, Judgment/Insight WNL        Diagnostic Findings:     LABS: Personally reviewed  Results from last 4 days   Lab Units 06/18/22  0732 06/16/22  0346   WBC AUTO 10*3/uL 11.2* 12.3*   HEMOGLOBIN g/dL 11.0* 11.7   HEMATOCRIT % 33.3* 37.4   PLATELETS AUTO 10*3/uL 195 195     Results from last 4 days   Lab Units 06/18/22  0732 06/17/22  1109 06/16/22  0346   SODIUM mmol/L 142 140 141   POTASSIUM MMOL/L 4.4 4.2 4.5   CHLORIDE mmol/L 102 98 102   CO2 mmol/L 32 33* 31   BUN mg/dL 49* 49* 43*   CREATININE mg/dL 0.93 1.14* 1.37*   CALCIUM mg/dL 10.5* 11.2* 10.9*   TOTAL PROTEIN g/dL  --   --  8.0   BILIRUBIN TOTAL mg/dL  --   --  0.52   ALK PHOS U/L  --   --  102*   ALT U/L  --   --  29   AST U/L  --   --  19   GLUCOSE mg/dL 90 138* 212*

## 2022-06-18 NOTE — INTERDISCIPLINARY/THERAPY
"Spiritual Care Services   06/17/22 1800   Clinical Encounter Type   Referral By: Epic consult order   Visited With Patient   Visit Type Initial   Zoroastrianism Affilation   Affiliated with [facility] Yes   Current [facility] Affiliation Hoahaoism   Spiritual/Emotional Distress   Level Moderate   Plan of Care   Spiritual Care Plan Initiated Provide supportive presence   Spiritual Assessment   Completed by    Mor Beliefs Believes in a Higher Power;Believes Higher Power can help with healing   Relationships Family is a support   Relationship Comments Family in [place]   Spiritual Interventions   Spiritual/Zoroastrianism Interventions Zoroastrianism texts provided;Prayer provided   Emotional/Spiritual Interventions Empathic and Reflective listening provided;Empowered patient;Fears addressed   Change, Adjustiment & Loss Intervention Provided grief support for past loss   Family Spiritual Care Encounters   Caregiver-Patient Relationship Good family support    responded to Epic consult - patient request.   Offered reflective listening, prayer and \"Our Daily Bread\" devotional.  Spiritual Care Services remain available.   "

## 2022-06-19 PROBLEM — R30.0 DYSURIA: Status: RESOLVED | Noted: 2022-06-18 | Resolved: 2022-06-19

## 2022-06-19 PROBLEM — E21.3 HYPERPARATHYROIDISM (CMS/HCC): Status: ACTIVE | Noted: 2022-06-19

## 2022-06-19 PROBLEM — N17.9 ACUTE KIDNEY INJURY (CMS/HCC): Status: RESOLVED | Noted: 2022-06-16 | Resolved: 2022-06-19

## 2022-06-19 LAB
ANION GAP SERPL CALC-SCNC: 7 MMOL/L (ref 3–11)
BASOPHILS # BLD AUTO: 0 10*3/UL
BASOPHILS NFR BLD AUTO: 0 % (ref 0–2)
BUN SERPL-MCNC: 58 MG/DL (ref 7–25)
CALCIUM SERPL-MCNC: 10.8 MG/DL (ref 8.6–10.3)
CHLORIDE SERPL-SCNC: 100 MMOL/L (ref 98–107)
CO2 SERPL-SCNC: 32 MMOL/L (ref 21–32)
CREAT SERPL-MCNC: 0.94 MG/DL (ref 0.6–1.1)
EOSINOPHIL # BLD AUTO: 0 10*3/UL
EOSINOPHIL NFR BLD AUTO: 0 % (ref 0–3)
ERYTHROCYTE [DISTWIDTH] IN BLOOD BY AUTOMATED COUNT: 13.7 % (ref 11.5–14)
GFR SERPL CREATININE-BSD FRML MDRD: 75 ML/MIN/1.73M*2
GLUCOSE SERPL-MCNC: 96 MG/DL (ref 70–105)
HCT VFR BLD AUTO: 36.7 % (ref 34–45)
HGB BLD-MCNC: 12.1 G/DL (ref 11.5–15.5)
LYMPHOCYTES # BLD AUTO: 1.6 10*3/UL
LYMPHOCYTES NFR BLD AUTO: 17 % (ref 11–47)
MCH RBC QN AUTO: 29.8 PG (ref 28–33)
MCHC RBC AUTO-ENTMCNC: 33 G/DL (ref 32–36)
MCV RBC AUTO: 90.4 FL (ref 81–97)
MONOCYTES # BLD AUTO: 0.8 10*3/UL
MONOCYTES NFR BLD AUTO: 9 % (ref 3–11)
NEUTROPHILS # BLD AUTO: 6.7 10*3/UL
NEUTROPHILS NFR BLD AUTO: 73 % (ref 41–81)
PLATELET # BLD AUTO: 212 10*3/UL (ref 140–350)
PMV BLD AUTO: 8 FL (ref 6.9–10.8)
POTASSIUM SERPL-SCNC: 4.7 MMOL/L (ref 3.5–5.1)
RBC # BLD AUTO: 4.06 10*6/ΜL (ref 3.7–5.3)
SODIUM SERPL-SCNC: 139 MMOL/L (ref 135–145)
WBC # BLD AUTO: 9.2 10*3/UL (ref 4.5–10.5)

## 2022-06-19 PROCEDURE — 6370000100 HC RX 637 (ALT 250 FOR IP): Performed by: NURSE PRACTITIONER

## 2022-06-19 PROCEDURE — 6360000200 HC RX 636 W HCPCS (ALT 250 FOR IP): Performed by: INTERNAL MEDICINE

## 2022-06-19 PROCEDURE — 85025 COMPLETE CBC W/AUTO DIFF WBC: CPT | Performed by: INTERNAL MEDICINE

## 2022-06-19 PROCEDURE — 6370000100 HC RX 637 (ALT 250 FOR IP): Performed by: INTERNAL MEDICINE

## 2022-06-19 PROCEDURE — (BLANK) HC ROOM ICU INTERMEDIATE

## 2022-06-19 PROCEDURE — 2590000100 HC RX 259: Performed by: INTERNAL MEDICINE

## 2022-06-19 PROCEDURE — 80048 BASIC METABOLIC PNL TOTAL CA: CPT | Performed by: INTERNAL MEDICINE

## 2022-06-19 PROCEDURE — 36415 COLL VENOUS BLD VENIPUNCTURE: CPT | Performed by: INTERNAL MEDICINE

## 2022-06-19 PROCEDURE — 94640 AIRWAY INHALATION TREATMENT: CPT

## 2022-06-19 PROCEDURE — 99232 SBSQ HOSP IP/OBS MODERATE 35: CPT | Performed by: INTERNAL MEDICINE

## 2022-06-19 RX ADMIN — LEVALBUTEROL HYDROCHLORIDE 1.25 MG: 1.25 SOLUTION, CONCENTRATE RESPIRATORY (INHALATION) at 20:27

## 2022-06-19 RX ADMIN — LEVALBUTEROL HYDROCHLORIDE 1.25 MG: 1.25 SOLUTION, CONCENTRATE RESPIRATORY (INHALATION) at 13:54

## 2022-06-19 RX ADMIN — GUAIFENESIN 600 MG: 600 TABLET, EXTENDED RELEASE ORAL at 08:11

## 2022-06-19 RX ADMIN — LEVALBUTEROL HYDROCHLORIDE 1.25 MG: 1.25 SOLUTION, CONCENTRATE RESPIRATORY (INHALATION) at 07:16

## 2022-06-19 RX ADMIN — METOPROLOL TARTRATE 12.5 MG: 25 TABLET, FILM COATED ORAL at 20:26

## 2022-06-19 RX ADMIN — IPRATROPIUM BROMIDE 0.5 MG: 0.5 SOLUTION RESPIRATORY (INHALATION) at 07:16

## 2022-06-19 RX ADMIN — PREDNISONE 40 MG: 20 TABLET ORAL at 08:11

## 2022-06-19 RX ADMIN — IPRATROPIUM BROMIDE 0.5 MG: 0.5 SOLUTION RESPIRATORY (INHALATION) at 20:27

## 2022-06-19 RX ADMIN — ACETAMINOPHEN 650 MG: 325 TABLET ORAL at 11:50

## 2022-06-19 RX ADMIN — GUAIFENESIN 600 MG: 600 TABLET, EXTENDED RELEASE ORAL at 20:24

## 2022-06-19 RX ADMIN — AZITHROMYCIN MONOHYDRATE 500 MG: 250 TABLET ORAL at 08:11

## 2022-06-19 RX ADMIN — IPRATROPIUM BROMIDE 0.5 MG: 0.5 SOLUTION RESPIRATORY (INHALATION) at 13:55

## 2022-06-19 RX ADMIN — SENNOSIDES 17.2 MG: 8.6 TABLET, FILM COATED ORAL at 20:24

## 2022-06-19 RX ADMIN — ENOXAPARIN SODIUM 40 MG: 100 INJECTION SUBCUTANEOUS at 14:06

## 2022-06-19 NOTE — NURSING NOTE
Pt states after some antibiotic given her arm reacted to it. States arm has a pinkish rash to it. Arm is not hot or swollen. Rash is only slightly apparent. Does not itch.

## 2022-06-19 NOTE — NURSING END OF SHIFT
Nursing End of Shift Summary:    Patient: Lucretia Sands  MRN: [id number]  : 1975, Age: 46 y.o.    Location: [address] [place] [address]    Nursing Goals  Clinical Goals for the Shift: Monitor pt's hemodynamics, keep safe and comfortable    Narrative Summary of Progress Toward Clinical Goals:  Pt remained safe and comfortable. Using call light and asking for assist to commode at bedside. No c/o pain. Remained stable.  Did say thinks she might be allergic to some med given to her. Stated her arm had a red rash for awhile now just pinkish. Unable to figure out what med it might have been except her steroid that was given.    Barriers to Goals/Nursing Concerns:  No    New Patient or Family Concerns/Issues:  No    Shift Summary:   Significant Events & Communications to Providers (last 12 hours)     Last 5 Values    No documentation.             Oxygen Usage (last 12 hours)     Last 5 Values     Row Name 22                   Oxygen Weaning Trial by Nursing    Home Baseline Oxygen Flow Rate (L/min) 1 (L/min)        Home Oxygen Use Frequency Continuous        Is Patient on Room Air OR on the Same Amount of O2 as at Home? Yes                Mobility (last 12 hours)     Last 5 Values     Row Name 22 1908 22 2349 22 0402             Mobility    Patient Position Sitting Supine Supine                Urethral Catheter     Active Urethral Catheter     None            Active Lines     Active Central venous catheter / Peripherally inserted central catheter / Implantable Port / Hemodialysis catheter / Midline Catheter     None              Infusing Medications   Medication Dose Last Rate     PRN Medications   Medication Dose Last Admin   • hydrALAZINE  5 mg 5 mg at 22   • HYDROcodone-acetaminophen  1 tablet 1 tablet at 22   • sodium chloride 0.9% (NS)  25-50 mL      And   • sodium chloride  3 mL     • sodium chloride  10 mL     • bisacodyL  10 mg     • sodium phosphates  1 enema     •  acetaminophen  650 mg 650 mg at 06/18/22 0741   • magnesium sulfate  2 g     • ondansetron  4 mg 4 mg at 06/17/22 1909   • levalbuterol  1.25 mg     • ipratropium  0.5 mg     • sodium chloride  10 mL       _________________________  Kyleigh Silva RN  06/19/22 5:49 AM

## 2022-06-19 NOTE — PLAN OF CARE
Problem: Infection Control  Goal: MINIMIZE THE ACQUISITION AND TRANSMISSION OF INFECTIOUS AGENTS  Description: INTERVENTIONS:  1. Isolate patient with suspected/diagnosed communicable disease  2. Place on designated isolation precautions  3. Maintain isolation techniques  4. Perform hand hygiene before and after each patient care activity  5. [place] universal precautions  6. Wear PPE as directed for type of isolation  7. Administer antibiotic therapy as ordered  8. Clean the environment appropriately after each patient use  9. Clean patient care equipment after each patient use as it leaves the room  10. Limit number of visitors, as appropriate  Outcome: Progressing     Problem: Knowledge Deficit  Goal: Patient/family/caregiver demonstrates understanding of disease process, treatment plan, medications, and discharge instructions  Description: INTERVENTIONS:   1. Complete learning assessment and assess knowledge base  2. Provide teaching at level of understanding   3. Provide teaching via preferred learning methods  Outcome: Progressing     Problem: Potential for Compromised Skin Integrity  Goal: Skin Integrity is Maintained or Improved  Description: INTERVENTIONS:  1. Assess and monitor skin integrity  2. Collaborate with interdisciplinary team and initiate plans and interventions as needed  3. Alternate a full bath with partial baths for elderly   4. Monitor patient's hygiene practices   5. Collaborate with wound, ostomy, and continence nurse  Outcome: Progressing  Goal: Nutritional status is improving  Description: INTERVENTIONS:  1. Monitor and assess patient for malnutrition (ex- brittle hair, bruises, dry skin, pale skin and conjunctiva, muscle wasting, smooth red tongue, and disorientation)  2. Monitor patient's weight and dietary intake as ordered or per policy  3. Determine patient's food preferences and provide high-protein, high-caloric foods as appropriate  4. Assist patient with eating   5. Allow  adequate time for meals   6. Encourage patient to take dietary supplement as ordered   7. Collaborate with dietitian  8. Include patient/family/caregiver in decisions related to nutrition  Outcome: Progressing  Goal: MOBILITY IS MAINTAINED OR IMPROVED  Description: INTERVENTIONS  1. Collaborate with interdisciplinary team and initiate plan and interventions as ordered (PT/OT)  2. Encourage ambulation  3. Up to chair for meals  4. Monitor for signs of deconditioning  Outcome: Progressing     Problem: Urinary Incontinence  Goal: Perineal skin integrity is maintained or improved  Description: INTERVENTIONS:  1. Assess genitourinary system, perineal skin, labs (urinalysis), and history of incontinence to include past management, aggravating, and alleviating factors  2. Collaborate with interdisciplinary team including wound, ostomy, and continence nurse and initiate plans and interventions as needed  4. Consider urine containment device  5. Apply skin protectant   6. Develop skin care regimen  7. Provide privacy when changing patient's incontinence device to maintain their dignity  Outcome: Progressing     Problem: Safety Adult - Fall  Goal: Free from fall injury  Description: INTERVENTIONS:    Inpatient - Please reference Cares/Safety Flowsheet under [place] Fall Risk for interventions.  Pediatrics - Please reference Peds Daily Cares/Safety Flowsheet under [place] Pediatric Fall Assessment Fall Bundle for interventions  LD/OB - Please reference OB Shift Screening Flowsheet under OB Fall Risk for interventions.  Outcome: Progressing     Problem: Pain - Adult  Goal: Verbalizes/displays adequate comfort level or baseline comfort level  Description: INTERVENTIONS:  1. Encourage patient to monitor pain and request interventions  2. Assess pain using the appropriate pain scale  3. Administer analgesics based on type and severity of pain and evaluate response  4. Educate/Implement non-pharmacological measures as appropriate  and evaluate response  5. Consider cultural, developmental and social influences on pain and pain management  6. Notify Provider if interventions unsuccessful or patient reports new pain  Outcome: Progressing     Problem: Infection - Adult  Goal: Absence of infection during hospitalization  Description: INTERVENTIONS:  1. Assess and monitor for signs and symptoms of infection  2. Monitor lab/diagnostic results  3. Monitor all insertion sites/wounds/incisions  4. Monitor secretions for changes in amount and color  5. Administer medications as ordered  6. Educate and encourage patient and family to use good hand hygiene technique  7. Identify and educate in appropriate isolation precautions for identified infection/condition  Outcome: Progressing     Problem: Safety Adult  Goal: Patient will remain safe during hospitalization  Description: INTERVENTIONS    1. Assess patient for fall risk and implement interventions if needed  2. Use safe transport techniques  3. Assess patient using the appropriate Jarred skin assessment scale  4. Assess patient for risk of aspiration  5. Assess patient for risk of elopement  6. Assess patient for risk of suicide  Outcome: Progressing     Problem: Daily Care  Goal: Daily care needs are met  Description: INTERVENTIONS:   1. Assess and monitor skin integrity  2. Identify patients at risk for skin breakdown on admission and per policy  3. Assess and monitor ability to perform self care and identify potential discharge needs  4. Assess skin integrity/risk for skin breakdown  5. Assist patient with activities of daily living as needed  6. Encourage independent activity per ability   7. Provide mouth care   8. Include patient/family/caregiver in decisions related to daily care   Outcome: Progressing     Problem: Discharge Barriers  Goal: Patient's discharge needs are met  Description: INTERVENTIONS:  1. Assess patient for self-management skills  2. Encourage participation in management  3.  Identify potential discharge barriers on admission and throughout hospital stay  4. Involve patient/family/caregiver in discharge planning process  5. Collaborate with case management/ for discharge needs  Outcome: Progressing     Problem: Safety - Medical Restraint  Goal: Remains free of injury from restraints (Restraint for Interference with Medical Device)  Description: INTERVENTIONS:  1. Determine that other, less restrictive measures have been tried or would not be effective before applying the restraint  2. Evaluate the patient's condition at the time of restraint application  3. Inform patient/family regarding the reason for restraint  4. Monitor safety, psychosocial status, comfort, nutrition and hydration  5. Assess continued need for restraints  6. Identify and implement measures to help patient regain control  Outcome: Progressing     Problem: PT Misc  Goal: STG - Misc 1  Outcome: Progressing  Goal: STG - Misc 2  Outcome: Progressing     Problem: Balance  Goal: STG - Maintains static sitting balance with upper extremity support  Description: X 5 minutes w/ Mod A  in prep for ADLs at EOB  Outcome: Progressing     Problem: OT Misc  Goal: STG - Misc 1  Description: Pt will follow 1 step commands w/ 75% accuracy in order to complete hand/face hygiene w/ min cues..  Outcome: Progressing     Problem: Respiratory - Adult  Goal: Achieves optimal ventilation and oxygenation  Description: INTERVENTIONS:  1. Assess for changes in respiratory status  2. Assess for changes in mentation and behavior  3. Position to facilitate oxygenation and minimize respiratory effort  4. Oxygen supplementation based on oxygen saturation or ABGs  5. Assess patient's ability to cough effectively  6. Encourage broncho-pulmonary hygiene including cough, deep breathe  7. Assess the need for suctioning   8. Assess and instruct to report SOB or any respiratory difficulty  9. Respiratory Therapy support as indicated, including  medications and treatment.  Outcome: Progressing

## 2022-06-19 NOTE — PLAN OF CARE
Problem: Infection Control  Goal: MINIMIZE THE ACQUISITION AND TRANSMISSION OF INFECTIOUS AGENTS  Description: INTERVENTIONS:  1. Isolate patient with suspected/diagnosed communicable disease  2. Place on designated isolation precautions  3. Maintain isolation techniques  4. Perform hand hygiene before and after each patient care activity  5. [place] universal precautions  6. Wear PPE as directed for type of isolation  7. Administer antibiotic therapy as ordered  8. Clean the environment appropriately after each patient use  9. Clean patient care equipment after each patient use as it leaves the room  10. Limit number of visitors, as appropriate  Outcome: Progressing     Problem: Knowledge Deficit  Goal: Patient/family/caregiver demonstrates understanding of disease process, treatment plan, medications, and discharge instructions  Description: INTERVENTIONS:   1. Complete learning assessment and assess knowledge base  2. Provide teaching at level of understanding   3. Provide teaching via preferred learning methods  Outcome: Progressing     Problem: Potential for Compromised Skin Integrity  Goal: Skin Integrity is Maintained or Improved  Description: INTERVENTIONS:  1. Assess and monitor skin integrity  2. Collaborate with interdisciplinary team and initiate plans and interventions as needed  3. Alternate a full bath with partial baths for elderly   4. Monitor patient's hygiene practices   5. Collaborate with wound, ostomy, and continence nurse  Outcome: Progressing  Goal: Nutritional status is improving  Description: INTERVENTIONS:  1. Monitor and assess patient for malnutrition (ex- brittle hair, bruises, dry skin, pale skin and conjunctiva, muscle wasting, smooth red tongue, and disorientation)  2. Monitor patient's weight and dietary intake as ordered or per policy  3. Determine patient's food preferences and provide high-protein, high-caloric foods as appropriate  4. Assist patient with eating   5. Allow  adequate time for meals   6. Encourage patient to take dietary supplement as ordered   7. Collaborate with dietitian  8. Include patient/family/caregiver in decisions related to nutrition  Outcome: Progressing  Goal: MOBILITY IS MAINTAINED OR IMPROVED  Description: INTERVENTIONS  1. Collaborate with interdisciplinary team and initiate plan and interventions as ordered (PT/OT)  2. Encourage ambulation  3. Up to chair for meals  4. Monitor for signs of deconditioning  Outcome: Progressing     Problem: Urinary Incontinence  Goal: Perineal skin integrity is maintained or improved  Description: INTERVENTIONS:  1. Assess genitourinary system, perineal skin, labs (urinalysis), and history of incontinence to include past management, aggravating, and alleviating factors  2. Collaborate with interdisciplinary team including wound, ostomy, and continence nurse and initiate plans and interventions as needed  4. Consider urine containment device  5. Apply skin protectant   6. Develop skin care regimen  7. Provide privacy when changing patient's incontinence device to maintain their dignity  Outcome: Progressing     Problem: Safety Adult - Fall  Goal: Free from fall injury  Description: INTERVENTIONS:    Inpatient - Please reference Cares/Safety Flowsheet under [place] Fall Risk for interventions.  Pediatrics - Please reference Peds Daily Cares/Safety Flowsheet under [place] Pediatric Fall Assessment Fall Bundle for interventions  LD/OB - Please reference OB Shift Screening Flowsheet under OB Fall Risk for interventions.  Outcome: Progressing     Problem: Pain - Adult  Goal: Verbalizes/displays adequate comfort level or baseline comfort level  Description: INTERVENTIONS:  1. Encourage patient to monitor pain and request interventions  2. Assess pain using the appropriate pain scale  3. Administer analgesics based on type and severity of pain and evaluate response  4. Educate/Implement non-pharmacological measures as appropriate  and evaluate response  5. Consider cultural, developmental and social influences on pain and pain management  6. Notify Provider if interventions unsuccessful or patient reports new pain  Outcome: Progressing     Problem: Infection - Adult  Goal: Absence of infection during hospitalization  Description: INTERVENTIONS:  1. Assess and monitor for signs and symptoms of infection  2. Monitor lab/diagnostic results  3. Monitor all insertion sites/wounds/incisions  4. Monitor secretions for changes in amount and color  5. Administer medications as ordered  6. Educate and encourage patient and family to use good hand hygiene technique  7. Identify and educate in appropriate isolation precautions for identified infection/condition  Outcome: Progressing     Problem: Safety Adult  Goal: Patient will remain safe during hospitalization  Description: INTERVENTIONS    1. Assess patient for fall risk and implement interventions if needed  2. Use safe transport techniques  3. Assess patient using the appropriate Jarred skin assessment scale  4. Assess patient for risk of aspiration  5. Assess patient for risk of elopement  6. Assess patient for risk of suicide  Outcome: Progressing     Problem: Daily Care  Goal: Daily care needs are met  Description: INTERVENTIONS:   1. Assess and monitor skin integrity  2. Identify patients at risk for skin breakdown on admission and per policy  3. Assess and monitor ability to perform self care and identify potential discharge needs  4. Assess skin integrity/risk for skin breakdown  5. Assist patient with activities of daily living as needed  6. Encourage independent activity per ability   7. Provide mouth care   8. Include patient/family/caregiver in decisions related to daily care   Outcome: Progressing     Problem: Discharge Barriers  Goal: Patient's discharge needs are met  Description: INTERVENTIONS:  1. Assess patient for self-management skills  2. Encourage participation in management  3.  Identify potential discharge barriers on admission and throughout hospital stay  4. Involve patient/family/caregiver in discharge planning process  5. Collaborate with case management/ for discharge needs  Outcome: Progressing     Problem: Safety - Medical Restraint  Goal: Remains free of injury from restraints (Restraint for Interference with Medical Device)  Description: INTERVENTIONS:  1. Determine that other, less restrictive measures have been tried or would not be effective before applying the restraint  2. Evaluate the patient's condition at the time of restraint application  3. Inform patient/family regarding the reason for restraint  4. Monitor safety, psychosocial status, comfort, nutrition and hydration  5. Assess continued need for restraints  6. Identify and implement measures to help patient regain control  Outcome: Progressing     Problem: PT Misc  Goal: STG - Misc 1  Outcome: Progressing  Goal: STG - Misc 2  Outcome: Progressing     Problem: Balance  Goal: STG - Maintains static sitting balance with upper extremity support  Description: X 5 minutes w/ Mod A  in prep for ADLs at EOB  Outcome: Progressing     Problem: OT Misc  Goal: STG - Misc 1  Description: Pt will follow 1 step commands w/ 75% accuracy in order to complete hand/face hygiene w/ min cues..  Outcome: Progressing     Problem: Respiratory - Adult  Goal: Achieves optimal ventilation and oxygenation  Description: INTERVENTIONS:  1. Assess for changes in respiratory status  2. Assess for changes in mentation and behavior  3. Position to facilitate oxygenation and minimize respiratory effort  4. Oxygen supplementation based on oxygen saturation or ABGs  5. Assess patient's ability to cough effectively  6. Encourage broncho-pulmonary hygiene including cough, deep breathe  7. Assess the need for suctioning   8. Assess and instruct to report SOB or any respiratory difficulty  9. Respiratory Therapy support as indicated, including  medications and treatment.  Outcome: Progressing

## 2022-06-19 NOTE — INTERDISCIPLINARY/THERAPY
responded to Nurse Epic Message -  PT. requesting visit.   Reflective listening, prayer and Yarsani booklet offered. Spiritual Care Services remain available.

## 2022-06-19 NOTE — PROGRESS NOTES
"  [facility]    (formerly [facility])  [address] [place] [address]        Hospitalist/Internal Medicine - Progress Note        Lucretia Sands  :  1975   Age: 46 y.o.  MRN: [id number]   Code Status: Full Code  Primary Care Physician:  Pcp No   Admit Date:  2022    Date of Service: 2022     LOS: 3  Admitting Diagnosis: Chronic obstructive pulmonary disease with acute exacerbation (CMS/HCC) (HCC)      Patient Summary: Lucretia Sands is a 46 y.o. female with a PMHx of depression, anxiety, HTN, CKDz stage II and 1 LPM O2-dependent end-stage COPD who noted onset of \"puffy eyes\" on 2022.  Over the next few days she then developed a cough with clear phlegm production for which she took Mucinex, Tessalon and acetaminophen.  She continued her usual 1 LPM home O2 and nebulizer regimen.  By 6/15/2022 she began feeling worse and had increasing cough and SOB for which she initially presented to the [facility] in [place], [place] where she was in severe respiratory distress and became unarousable.  She was intubated prior to transport to this facility.  She was also hypotensive for which a Levophed gtt was started prior to transfer.  Need for higher level of care prompted air transport to our ED from where patient was subsequently admitted to the ICU on 2022 for further eval & Tx.  She was subsequently extubated and transferred to the medical floor where the Hospitalist service assumed care on 2022.    Pertinent pulmonary history obtained from patient (no records available for review): Unclear etiology of end-stage of COPD.  No smoking Hx or inhalational injury Hx.  Had \"chronic pneumonia\" as a child.  Sees Dr. Perales ([facility] Pulmonologist) in [place], [place].  Was told she needs a lung transplant.  Referred to [facility]; states she was told that she did not yet need a lung transplant and was referred to Pulmonary Rehab " "which she stopped after a month since she did not notice any benefit.  Had a previous severe exacerbation episode requiring intubation/mechanical ventilation in 2020 in [place], [place].      Hospital Problem List  Active Hospital Problems    Diagnosis Date Noted   • *Chronic obstructive pulmonary disease with acute exacerbation (CMS/[facility]) ([facility]) 06/16/2022     Priority: 1 - High   • Acute respiratory failure with hypoxia and hypercapnia (CMS/[facility]) ([facility]) 06/16/2022     Priority: 1 - High   • Chronic respiratory failure with hypoxia, on home O2 therapy (CMS/[facility]) ([facility]) 06/18/2022     Priority: 2      1 LPM baseline home O2     • Hypotension      Priority: 2    • Hyperparathyroidism (CMS/[facility]) ([facility]) 06/19/2022   • CKD (chronic kidney disease) stage 2, GFR 60-89 ml/min 06/18/2022   • Hypertension, essential    • Hypercalcemia    • Acute kidney injury (CMS/[facility]) ([facility]) 06/16/2022      Resolved Hospital Problems    Diagnosis Date Noted Date Resolved   • Dysuria 06/18/2022 06/19/2022         Assessment & Plan  #Acute exacerbation of O2-dependent end-stage COPD  Pertinent pulmonary history obtained from patient (no records available for review): Unclear etiology of end-stage of COPD.  No smoking Hx or inhalational injury Hx.  Had \"chronic pneumonia\" as a child.  Sees Dr. Perales ([facility] Pulmonologist) in [place], [place].  Was told she needs a lung transplant.  Referred to [facility]; states she was told that she did not yet need a lung transplant and was referred to Pulmonary Rehab which she stopped after a month since she did not notice any benefit.  Had a previous severe exacerbation episode requiring intubation/mechanical ventilation in 2020 in [place], [place].  6/15/2022 CT thorax w/contrast at outside facility negative for definitive focal infection   -New nodularity in peripheral segment of lateral RML   -Mosaic groundglass appearance of lungs increased from 5/23/2022   -Small right and trace left pleural effusions " noted.   -Small areas of bilateral nodularity unchanged on 5/23/2022  CT C/A/P 6/16/2020 with similar findings; no obvious focal infection  Respiratory pathogen panel & COVID-19 PCR negative  Sputum culture & endotracheal culture negative  Blood culture NGTD  Empiric ceftriaxone & azithromycin started on admit (azithromycin included primarily for anti-inflammatory benefit)   -D/C ceftriaxone (received 2 days) as no clear-cut bacterial infection identified   -Changed azithromycin from IV to oral  Continue scheduled ipratropium/levalbuterol neb 3 times daily & PRN nebs as well  D/C'd IV methylprednisolone 40 Mg daily; transitioned to oral prednisone 6/19/2022  Continue scheduled guaifenesin  Assess ongoing O2 needs; currently at home baseline of 1 LPM    #Acute respiratory failure with hypoxia & hypercapnia  #Chronic respiratory failure with hypoxia on home O2  Due to, eval & Tx per COPD discussion  Baseline home O2 1 LPM  Assess ongoing O2 needs    #BNP elevation  Likely due to COPD exacerbation   -No suspicion for CHF as previously documented  Echo 6/16/2022 unremarkable.  Summary:  -LV normal size, thickness, wall motion and systolic/diastolic function with LVEF 73%  -RV normal size and systolic function  -LA mildly dilated.  RV normal size  Defer further cardiac eval    #Hypotension  Significant hypotension prompted starting Levophed gtt prior to departing [place], [place] facility  -Specifics unknown if BP occurred primarily or after receiving sedation/paralytics for elevation/mechanical ventilation  Hypotension generally resolved though with occasional BP drops to 87/37; asymptomatic while supine in bed  See hypertension discussion    #HTN, essential  Initially hypotensive at [place], [place] facility requiring Levophed gtt; see that discussion  Very variable BPs initially upon hospital admission   -Accelerated BP up to max 195/117 on 6/17/2022   -Subsequent low BP at 81/50  Current BPs 120s mmHg systolic with occasional  "\"soft\" BP at 87/37  Continue usual home amlodipine 5 Mg daily   -hold parameter for SBP <110 mmHg  Continue metoprolol XL at 12.5 Mg twice daily (lower than usual home dose of 25 Mg twice daily)   -Hold parameters for SBP <100 mmHg or heart rate <60 bpm  Monitor BPs    #Hypercalcemia  #Hyperparathyroidism, primary  Admit calcium 10.9.  Recheck calcium 11.2 (corrected calcium 11.1)  Elevated ionized calcium 1.37 (1.15-1.33) & elevated  (9-59)  Values c/w primary hyperparathyroidism  Discussion: After discussing results and diagnosis with patient, she recalls being told about this in the past.  Recommend follow-up with her PCP as well as a parathyroid scan which she does not think has been done yet.  Follow labs    #OTTONIEL on CKD stage II  Baseline renal function/lab values unknown  Admit creatinine 1.37  Creatinine normalized after initial IV fluids earlier this admission; IV fluids DC'd  Follow labs intermittently    #Dysuria  Due to Buitrago catheter removal with onset of slight dysuria immediately after Buitrago catheter removal  Unremarkable screening U/A  Already received ABX per COPD exacerbation discussion  Dysuria resolved    #DVT Prophylaxis:   Enoxaparin      Discussed with RN        Consultants:  CONSULT SEPSIS COORDINATOR  CONSULT TO INTENSIVIST  CONSULT TO SPIRITUAL CARE      Procedures:        Certification: Provider Inpatient Hospital Services Certification   Awaiting specific diagnostic testing and/or results that can impact the therapeutic care plan: Yes (6/18/2022  7:18 AM)  Current therapeutic care plan can only be provided in the hospital: Yes (6/18/2022  7:18 AM)  Current anti-infective care plan requires IV antibiotics that currently can only be gien in the inpatient setting: Yes (6/18/2022  7:18 AM)  Discharge expected in : Other (comments) (To be determined) (6/18/2022  7:18 AM)  Predicted/Planned discharge to: other (comments) (To be determined) (6/18/2022  7:18 " AM)      Disposition:        This document was created with the assistance of voice recognition software. I have reviewed the documentation and affirm that it represents information collected by myself upon interviewing and examining the patient, the medical record and other available sources of information. While I have reviewed the content for accuracy, some typographical and dictation errors may exist.  Davidson Rubio MD 6/19/2022 6:15 PM  ___________________________________________________________________  Interval History: Feeling/breathing even better today.  Still some SOB, but feels she is returning toward her baseline. Cough persists with more clear phlegm production.  Denies pain with breathing.  Denies chest pain or palpitations.  No abdominal pain or N/V.    Allergies and labs reviewed  Scheduled Meds:amLODIPine, 5 mg, oral, Daily  metoprolol tartrate, 12.5 mg, oral, 2x daily  predniSONE, 40 mg, oral, Daily  azithromycin, 500 mg, oral, Daily  ipratropium, 0.5 mg, nebulization, 3x daily  levalbuterol, 1.25 mg, nebulization, 3x daily  guaiFENesin, 600 mg, oral, 2x daily  Nozin Nasal Antiseptic POPSwab, 1 application, nasal, 2x daily  senna, 17.2 mg, oral, Nightly  enoxaparin, 40 mg, subcutaneous, Daily at 1400        Objective   Temp:  [36.7 °C (98.1 °F)-37.2 °C (98.9 °F)] 37.2 °C (98.9 °F)  Heart Rate:  [61-97] 83  Resp:  [16-20] 19  SpO2:  [88 %-100 %] 94 %  BP: ()/(37-81) 94/47  SpO2/FiO2 Ratio Using Approximate FiO2 (%):  [366.7-416.7] 391.7  Estimated P/F Ratio Using Approximate FiO2 (%):  [315.3-355.8] 335.6    Intake/Output last 3 shifts:  I/O last 3 completed shifts:  In: 1100.7 [P.O.:955; IV Piggyback:145.7]  Out: 2325 [Urine:2325]  Intake/Output this shift:  I/O this shift:  In: 483 [P.O.:483]  Out: -     Constitutional  General Appearance:  Comfortable  Orientation:  Findings of: Alert and oriented x3    HEENT  Head:  Normocephalic/Atraumatic  Eyes:  Findings of: Conjunctivae Clear     Negative for: Scleral Icterus    Respiratory  Respiratory Effort:  Findings of: Normal    Negative for: Labored  Auscultation:  Findings of: Breath sounds less coarse at bilateral bases.  Trivial residual wheezing at right lung base only with forced expiration.  Upper lung fields clear to Auscultation    Negative for: Crackles/Rales, Rhonchi, E-A changes    Cardiovascular  Heart Rate:  Regular Rate  Rhythm:  Regular  Heart Sounds:  Normal: S1, S2  Abnormal Heart Sounds:  Negative for: Murmur, Rub, S3 gallop, S4 gallop    Abdomen  Abdomen:  Findings of: Normal Appearance, Soft, Non-Tender    Negative for: Distension    Genitourinary  Urinary Catheter Present?:  No    Extremities/Musculoskeletal  Extremities:  Negative for: Clubbing, Cyanosis, Edema    Neurological  Responsiveness:  Awake, Alert  Cranial Nerves II-XII Intact:  Yes    Psychiatric  Cognition:  Findings of: Mood/Affect WNL, Judgment/Insight WNL        Diagnostic Findings:     LABS: Personally reviewed  Results from last 4 days   Lab Units 06/19/22  0503 06/18/22  0732 06/16/22  0346   WBC AUTO 10*3/uL 9.2 11.2* 12.3*   HEMOGLOBIN g/dL 12.1 11.0* 11.7   HEMATOCRIT % 36.7 33.3* 37.4   PLATELETS AUTO 10*3/uL 212 195 195     Results from last 4 days   Lab Units 06/19/22  0503 06/18/22  0732 06/17/22  1109 06/16/22  0346   SODIUM mmol/L 139 142 140 141   POTASSIUM MMOL/L 4.7 4.4 4.2 4.5   CHLORIDE mmol/L 100 102 98 102   CO2 mmol/L 32 32 33* 31   BUN mg/dL 58* 49* 49* 43*   CREATININE mg/dL 0.94 0.93 1.14* 1.37*   CALCIUM mg/dL 10.8* 10.5* 11.2* 10.9*   TOTAL PROTEIN g/dL  --   --   --  8.0   BILIRUBIN TOTAL mg/dL  --   --   --  0.52   ALK PHOS U/L  --   --   --  102*   ALT U/L  --   --   --  29   AST U/L  --   --   --  19   GLUCOSE mg/dL 96 90 138* 212*

## 2022-06-20 LAB
ANION GAP SERPL CALC-SCNC: 6 MMOL/L (ref 3–11)
BASOPHILS # BLD AUTO: 0 10*3/UL
BASOPHILS NFR BLD AUTO: 0 % (ref 0–2)
BUN SERPL-MCNC: 63 MG/DL (ref 7–25)
CALCIUM SERPL-MCNC: 10.7 MG/DL (ref 8.6–10.3)
CHLORIDE SERPL-SCNC: 101 MMOL/L (ref 98–107)
CO2 SERPL-SCNC: 30 MMOL/L (ref 21–32)
CREAT SERPL-MCNC: 0.95 MG/DL (ref 0.6–1.1)
EOSINOPHIL # BLD AUTO: 0 10*3/UL
EOSINOPHIL NFR BLD AUTO: 1 % (ref 0–3)
ERYTHROCYTE [DISTWIDTH] IN BLOOD BY AUTOMATED COUNT: 13.4 % (ref 11.5–14)
GFR SERPL CREATININE-BSD FRML MDRD: 74 ML/MIN/1.73M*2
GLUCOSE SERPL-MCNC: 100 MG/DL (ref 70–105)
HCT VFR BLD AUTO: 36.4 % (ref 34–45)
HGB BLD-MCNC: 11.9 G/DL (ref 11.5–15.5)
LYMPHOCYTES # BLD AUTO: 2 10*3/UL
LYMPHOCYTES NFR BLD AUTO: 23 % (ref 11–47)
MCH RBC QN AUTO: 29.4 PG (ref 28–33)
MCHC RBC AUTO-ENTMCNC: 32.7 G/DL (ref 32–36)
MCV RBC AUTO: 89.9 FL (ref 81–97)
MONOCYTES # BLD AUTO: 0.8 10*3/UL
MONOCYTES NFR BLD AUTO: 10 % (ref 3–11)
NEUTROPHILS # BLD AUTO: 5.6 10*3/UL
NEUTROPHILS NFR BLD AUTO: 66 % (ref 41–81)
PLATELET # BLD AUTO: 213 10*3/UL (ref 140–350)
PMV BLD AUTO: 7.8 FL (ref 6.9–10.8)
POTASSIUM SERPL-SCNC: 4.5 MMOL/L (ref 3.5–5.1)
RBC # BLD AUTO: 4.05 10*6/ΜL (ref 3.7–5.3)
SODIUM SERPL-SCNC: 137 MMOL/L (ref 135–145)
WBC # BLD AUTO: 8.5 10*3/UL (ref 4.5–10.5)

## 2022-06-20 PROCEDURE — 6360000200 HC RX 636 W HCPCS (ALT 250 FOR IP): Performed by: INTERNAL MEDICINE

## 2022-06-20 PROCEDURE — 6370000100 HC RX 637 (ALT 250 FOR IP): Performed by: NURSE PRACTITIONER

## 2022-06-20 PROCEDURE — 2590000100 HC RX 259: Performed by: INTERNAL MEDICINE

## 2022-06-20 PROCEDURE — 6370000100 HC RX 637 (ALT 250 FOR IP): Performed by: INTERNAL MEDICINE

## 2022-06-20 PROCEDURE — 80048 BASIC METABOLIC PNL TOTAL CA: CPT | Performed by: INTERNAL MEDICINE

## 2022-06-20 PROCEDURE — 99232 SBSQ HOSP IP/OBS MODERATE 35: CPT | Performed by: INTERNAL MEDICINE

## 2022-06-20 PROCEDURE — 94640 AIRWAY INHALATION TREATMENT: CPT

## 2022-06-20 PROCEDURE — 97535 SELF CARE MNGMENT TRAINING: CPT | Mod: GO | Performed by: OCCUPATIONAL THERAPIST

## 2022-06-20 PROCEDURE — 97110 THERAPEUTIC EXERCISES: CPT | Mod: GO | Performed by: OCCUPATIONAL THERAPIST

## 2022-06-20 PROCEDURE — 36415 COLL VENOUS BLD VENIPUNCTURE: CPT | Performed by: INTERNAL MEDICINE

## 2022-06-20 PROCEDURE — 2580000300 HC RX 258: Performed by: INTERNAL MEDICINE

## 2022-06-20 PROCEDURE — 85025 COMPLETE CBC W/AUTO DIFF WBC: CPT | Performed by: INTERNAL MEDICINE

## 2022-06-20 PROCEDURE — (BLANK) HC ROOM ICU INTERMEDIATE

## 2022-06-20 PROCEDURE — 97530 THERAPEUTIC ACTIVITIES: CPT | Mod: GO | Performed by: OCCUPATIONAL THERAPIST

## 2022-06-20 RX ORDER — SODIUM CHLORIDE 9 MG/ML
1000 INJECTION, SOLUTION INTRAVENOUS ONCE
Status: COMPLETED | OUTPATIENT
Start: 2022-06-20 | End: 2022-06-20

## 2022-06-20 RX ORDER — PREDNISONE 20 MG/1
20 TABLET ORAL DAILY
Status: DISCONTINUED | OUTPATIENT
Start: 2022-06-21 | End: 2022-06-21

## 2022-06-20 RX ADMIN — ENOXAPARIN SODIUM 40 MG: 100 INJECTION SUBCUTANEOUS at 16:10

## 2022-06-20 RX ADMIN — AZITHROMYCIN MONOHYDRATE 500 MG: 250 TABLET ORAL at 09:04

## 2022-06-20 RX ADMIN — LEVALBUTEROL HYDROCHLORIDE 1.25 MG: 1.25 SOLUTION, CONCENTRATE RESPIRATORY (INHALATION) at 07:53

## 2022-06-20 RX ADMIN — SODIUM CHLORIDE 1000 ML: 9 INJECTION, SOLUTION INTRAVENOUS at 12:48

## 2022-06-20 RX ADMIN — GUAIFENESIN 600 MG: 600 TABLET, EXTENDED RELEASE ORAL at 09:04

## 2022-06-20 RX ADMIN — ACETAMINOPHEN 650 MG: 325 TABLET ORAL at 23:17

## 2022-06-20 RX ADMIN — PREDNISONE 40 MG: 20 TABLET ORAL at 09:04

## 2022-06-20 RX ADMIN — ACETAMINOPHEN 650 MG: 325 TABLET ORAL at 10:54

## 2022-06-20 RX ADMIN — IPRATROPIUM BROMIDE 0.5 MG: 0.5 SOLUTION RESPIRATORY (INHALATION) at 07:53

## 2022-06-20 RX ADMIN — IPRATROPIUM BROMIDE 0.5 MG: 0.5 SOLUTION RESPIRATORY (INHALATION) at 13:32

## 2022-06-20 RX ADMIN — GUAIFENESIN 600 MG: 600 TABLET, EXTENDED RELEASE ORAL at 20:15

## 2022-06-20 RX ADMIN — IPRATROPIUM BROMIDE 0.5 MG: 0.5 SOLUTION RESPIRATORY (INHALATION) at 19:19

## 2022-06-20 RX ADMIN — SENNOSIDES 17.2 MG: 8.6 TABLET, FILM COATED ORAL at 20:15

## 2022-06-20 RX ADMIN — LEVALBUTEROL HYDROCHLORIDE 1.25 MG: 1.25 SOLUTION, CONCENTRATE RESPIRATORY (INHALATION) at 19:19

## 2022-06-20 RX ADMIN — LEVALBUTEROL HYDROCHLORIDE 1.25 MG: 1.25 SOLUTION, CONCENTRATE RESPIRATORY (INHALATION) at 13:32

## 2022-06-20 NOTE — INTERDISCIPLINARY/THERAPY
06/20/22 0819   Time Calculation   Start Time 0819   Stop Time 0843   Time Calculation (min) 24 min   OT Last Visit   OT Received On 06/20/22   General   Chart Reviewed Yes   Therapy Treatment Diagnosis resp failure w/ intubation   OT Treatment Duration (Min) 24 Minutes   Is this a Co-Treatment? No   Additional Pertinent History end stage COPD   Precautions   Reinforced Precautions Yes   Other Precautions monitor O2   Subjective Comments   RN Stated patient is medically cleared for therapy Yes   Subjective Comments Pt agreeable to OT , pt in bed at start of tx, ended in bedside chair with all needs within reach   Pain Assessment Scale   Pain Scale 0-10   Cognition   Arousal/Alertness WFL   Cognition Comment conversation appropriate, able to make needs known   Bed Mobility   Bed Mobility Assessed   Bed Mobility 1   Bed Mobility Comments 1 transition to EOB indep.   Transfers   Transfer Assessed   Transfer 1   Trials/Comments 1 Pt stood from EOB with SBA>   Ambulation 1   Ambulation Distance (meters) 65 meters   Comments 1 Pt ambulated 5m to bathroom,  60m in room with FWW and SBA.   Balance   Balance Intact   Grooming   Grooming Comments stood at sink to complete grooming with SBA   LE Dressing   LE Dressing   (donned socks indep at bed level)   Toileting   Toileting Comments distant supervision  for toileting   Activity Tolerance   Activity Tolerance Comments SpO2 dropped to 85% on 1l, improved with pursed lip breathing.   Assessment   Progress Discontinue OT   Recommendations for Therapy No further therapy needed   Assessment Comment Pt is able to complete ADL's and fx mobility with SBA,  no further skilled OT needs , dc OT   Therapeutic Interventions (Time Spent in Minutes)   Therapeutic Exercise 8   Therapeutic Activity Dynamic 8   ADL Selfcare Home Managment 8   Plan   Plan Comment d/c OT

## 2022-06-20 NOTE — INTERDISCIPLINARY/THERAPY
Spiritual Care Services:    ADALID Ortiz visited pt and offered pastoral support.  Service will remain available.

## 2022-06-20 NOTE — NURSING END OF SHIFT
Nursing End of Shift Summary:    Patient: Lucretia Sands  MRN: [id number]  : 1975, Age: 46 y.o.    Location: [address] [place] [address]    Nursing Goals  Clinical Goals for the Shift: monitor vital signs,maintain safety and comfort    Narrative Summary of Progress Toward Clinical Goals:  Alert and oriented. Vital signs been stable. Patient remained stable and comfortable throughout the shift. Able to use bathroom with  minimal assist using front wheel walker. All needs attended.    Barriers to Goals/Nursing Concerns:  No    New Patient or Family Concerns/Issues:  No    Shift Summary:   Significant Events & Communications to Providers (last 12 hours)     Last 5 Values    No documentation.             Oxygen Usage (last 12 hours)     Last 5 Values    No documentation.             Mobility (last 12 hours)     Last 5 Values     Row Name 22 1912 22 1945 22 0005 22 0316          Mobility    Activity -- Bathroom privileges -- --     Level of Assistance -- Standby assist, set-up cues, supervision of patient - no hands on -- --     Patient Position Sitting -- Supine Supine     Turning -- Turns self -- --               Urethral Catheter     Active Urethral Catheter     None            Active Lines     Active Central venous catheter / Peripherally inserted central catheter / Implantable Port / Hemodialysis catheter / Midline Catheter     None              Infusing Medications   Medication Dose Last Rate     PRN Medications   Medication Dose Last Admin   • hydrALAZINE  5 mg 5 mg at 22   • HYDROcodone-acetaminophen  1 tablet 1 tablet at 22   • sodium chloride 0.9% (NS)  25-50 mL      And   • sodium chloride  3 mL     • sodium chloride  10 mL     • bisacodyL  10 mg     • sodium phosphates  1 enema     • acetaminophen  650 mg 650 mg at 22 1150   • magnesium sulfate  2 g     • ondansetron  4 mg 4 mg at 22 1909   • levalbuterol  1.25 mg     • ipratropium  0.5 mg     • sodium chloride   10 mL       _________________________  Fartun Butt RN  06/20/22 5:40 AM

## 2022-06-20 NOTE — PLAN OF CARE
Problem: Infection Control  Goal: MINIMIZE THE ACQUISITION AND TRANSMISSION OF INFECTIOUS AGENTS  Description: INTERVENTIONS:  1. Isolate patient with suspected/diagnosed communicable disease  2. Place on designated isolation precautions  3. Maintain isolation techniques  4. Perform hand hygiene before and after each patient care activity  5. [place] universal precautions  6. Wear PPE as directed for type of isolation  7. Administer antibiotic therapy as ordered  8. Clean the environment appropriately after each patient use  9. Clean patient care equipment after each patient use as it leaves the room  10. Limit number of visitors, as appropriate  Outcome: Progressing     Problem: Knowledge Deficit  Goal: Patient/family/caregiver demonstrates understanding of disease process, treatment plan, medications, and discharge instructions  Description: INTERVENTIONS:   1. Complete learning assessment and assess knowledge base  2. Provide teaching at level of understanding   3. Provide teaching via preferred learning methods  Outcome: Progressing     Problem: Potential for Compromised Skin Integrity  Goal: Skin Integrity is Maintained or Improved  Description: INTERVENTIONS:  1. Assess and monitor skin integrity  2. Collaborate with interdisciplinary team and initiate plans and interventions as needed  3. Alternate a full bath with partial baths for elderly   4. Monitor patient's hygiene practices   5. Collaborate with wound, ostomy, and continence nurse  Outcome: Progressing  Goal: Nutritional status is improving  Description: INTERVENTIONS:  1. Monitor and assess patient for malnutrition (ex- brittle hair, bruises, dry skin, pale skin and conjunctiva, muscle wasting, smooth red tongue, and disorientation)  2. Monitor patient's weight and dietary intake as ordered or per policy  3. Determine patient's food preferences and provide high-protein, high-caloric foods as appropriate  4. Assist patient with eating   5. Allow  adequate time for meals   6. Encourage patient to take dietary supplement as ordered   7. Collaborate with dietitian  8. Include patient/family/caregiver in decisions related to nutrition  Outcome: Progressing  Goal: MOBILITY IS MAINTAINED OR IMPROVED  Description: INTERVENTIONS  1. Collaborate with interdisciplinary team and initiate plan and interventions as ordered (PT/OT)  2. Encourage ambulation  3. Up to chair for meals  4. Monitor for signs of deconditioning  Outcome: Progressing     Problem: Urinary Incontinence  Goal: Perineal skin integrity is maintained or improved  Description: INTERVENTIONS:  1. Assess genitourinary system, perineal skin, labs (urinalysis), and history of incontinence to include past management, aggravating, and alleviating factors  2. Collaborate with interdisciplinary team including wound, ostomy, and continence nurse and initiate plans and interventions as needed  4. Consider urine containment device  5. Apply skin protectant   6. Develop skin care regimen  7. Provide privacy when changing patient's incontinence device to maintain their dignity  Outcome: Progressing     Problem: Safety Adult - Fall  Goal: Free from fall injury  Description: INTERVENTIONS:    Inpatient - Please reference Cares/Safety Flowsheet under [place] Fall Risk for interventions.  Pediatrics - Please reference Peds Daily Cares/Safety Flowsheet under [place] Pediatric Fall Assessment Fall Bundle for interventions  LD/OB - Please reference OB Shift Screening Flowsheet under OB Fall Risk for interventions.  Outcome: Progressing     Problem: Pain - Adult  Goal: Verbalizes/displays adequate comfort level or baseline comfort level  Description: INTERVENTIONS:  1. Encourage patient to monitor pain and request interventions  2. Assess pain using the appropriate pain scale  3. Administer analgesics based on type and severity of pain and evaluate response  4. Educate/Implement non-pharmacological measures as appropriate  and evaluate response  5. Consider cultural, developmental and social influences on pain and pain management  6. Notify Provider if interventions unsuccessful or patient reports new pain  Outcome: Progressing     Problem: Infection - Adult  Goal: Absence of infection during hospitalization  Description: INTERVENTIONS:  1. Assess and monitor for signs and symptoms of infection  2. Monitor lab/diagnostic results  3. Monitor all insertion sites/wounds/incisions  4. Monitor secretions for changes in amount and color  5. Administer medications as ordered  6. Educate and encourage patient and family to use good hand hygiene technique  7. Identify and educate in appropriate isolation precautions for identified infection/condition  Outcome: Progressing     Problem: Safety Adult  Goal: Patient will remain safe during hospitalization  Description: INTERVENTIONS    1. Assess patient for fall risk and implement interventions if needed  2. Use safe transport techniques  3. Assess patient using the appropriate Jarred skin assessment scale  4. Assess patient for risk of aspiration  5. Assess patient for risk of elopement  6. Assess patient for risk of suicide  Outcome: Progressing     Problem: Daily Care  Goal: Daily care needs are met  Description: INTERVENTIONS:   1. Assess and monitor skin integrity  2. Identify patients at risk for skin breakdown on admission and per policy  3. Assess and monitor ability to perform self care and identify potential discharge needs  4. Assess skin integrity/risk for skin breakdown  5. Assist patient with activities of daily living as needed  6. Encourage independent activity per ability   7. Provide mouth care   8. Include patient/family/caregiver in decisions related to daily care   Outcome: Progressing     Problem: Discharge Barriers  Goal: Patient's discharge needs are met  Description: INTERVENTIONS:  1. Assess patient for self-management skills  2. Encourage participation in management  3.  Identify potential discharge barriers on admission and throughout hospital stay  4. Involve patient/family/caregiver in discharge planning process  5. Collaborate with case management/ for discharge needs  Outcome: Progressing     Problem: Safety - Medical Restraint  Goal: Remains free of injury from restraints (Restraint for Interference with Medical Device)  Description: INTERVENTIONS:  1. Determine that other, less restrictive measures have been tried or would not be effective before applying the restraint  2. Evaluate the patient's condition at the time of restraint application  3. Inform patient/family regarding the reason for restraint  4. Monitor safety, psychosocial status, comfort, nutrition and hydration  5. Assess continued need for restraints  6. Identify and implement measures to help patient regain control  Outcome: Progressing     Problem: PT Misc  Goal: STG - Misc 1  Outcome: Progressing  Goal: STG - Misc 2  Outcome: Progressing     Problem: Balance  Goal: STG - Maintains static sitting balance with upper extremity support  Description: X 5 minutes w/ Mod A  in prep for ADLs at EOB  Outcome: Progressing     Problem: OT Misc  Goal: STG - Misc 1  Description: Pt will follow 1 step commands w/ 75% accuracy in order to complete hand/face hygiene w/ min cues..  Outcome: Progressing     Problem: Respiratory - Adult  Goal: Achieves optimal ventilation and oxygenation  Description: INTERVENTIONS:  1. Assess for changes in respiratory status  2. Assess for changes in mentation and behavior  3. Position to facilitate oxygenation and minimize respiratory effort  4. Oxygen supplementation based on oxygen saturation or ABGs  5. Assess patient's ability to cough effectively  6. Encourage broncho-pulmonary hygiene including cough, deep breathe  7. Assess the need for suctioning   8. Assess and instruct to report SOB or any respiratory difficulty  9. Respiratory Therapy support as indicated, including  medications and treatment.  Outcome: Progressing

## 2022-06-20 NOTE — INTERDISCIPLINARY/THERAPY
06/20/22 1600   Time Calculation   Start Time 1458   Stop Time 1503   Time Calculation (min) 5 min   Subjective Comments   Subjective Comments Attempted to see pt for PT this date. BP taken due to high readings previously in the day. /107. Deferred gait at this time due to elevated BP. Per PCC and pt, pt has been up w/ FWW SBA. Pt expresses no mobility concerns. Pt does have 4WW at home and recommended to pt that she use that at this time. PT will sign off, as pt expresses no concerns w/ ambulation/mobility at this time.   Plan   Plan Comment DC PT

## 2022-06-21 VITALS
HEIGHT: 60 IN | HEART RATE: 91 BPM | TEMPERATURE: 98.3 F | WEIGHT: 141.09 LBS | RESPIRATION RATE: 18 BRPM | OXYGEN SATURATION: 94 % | DIASTOLIC BLOOD PRESSURE: 65 MMHG | BODY MASS INDEX: 27.7 KG/M2 | SYSTOLIC BLOOD PRESSURE: 109 MMHG

## 2022-06-21 LAB
BACTERIA BLD CULT: NORMAL
BACTERIA BLD CULT: NORMAL

## 2022-06-21 PROCEDURE — 6370000100 HC RX 637 (ALT 250 FOR IP): Performed by: NURSE PRACTITIONER

## 2022-06-21 PROCEDURE — 99239 HOSP IP/OBS DSCHRG MGMT >30: CPT | Performed by: INTERNAL MEDICINE

## 2022-06-21 PROCEDURE — 6360000200 HC RX 636 W HCPCS (ALT 250 FOR IP): Performed by: INTERNAL MEDICINE

## 2022-06-21 RX ORDER — METOPROLOL SUCCINATE 25 MG/1
12.5 TABLET, EXTENDED RELEASE ORAL DAILY
Qty: 15 TABLET | Refills: 0 | Status: SHIPPED | OUTPATIENT
Start: 2022-06-21

## 2022-06-21 RX ORDER — LORAZEPAM 0.5 MG/1
0.25 TABLET ORAL 2 TIMES DAILY PRN
Qty: 10 TABLET | Refills: 0 | Status: SHIPPED | OUTPATIENT
Start: 2022-06-21

## 2022-06-21 RX ORDER — ALBUTEROL SULFATE 90 UG/1
2 INHALANT RESPIRATORY (INHALATION) EVERY 4 HOURS PRN
Qty: 1 INHALER | Refills: 0 | Status: SHIPPED | OUTPATIENT
Start: 2022-06-21

## 2022-06-21 RX ADMIN — PREDNISONE 20 MG: 20 TABLET ORAL at 08:38

## 2022-06-21 RX ADMIN — ACETAMINOPHEN 650 MG: 325 TABLET ORAL at 13:07

## 2022-06-21 RX ADMIN — GUAIFENESIN 600 MG: 600 TABLET, EXTENDED RELEASE ORAL at 08:38

## 2022-06-21 NOTE — INTERDISCIPLINARY/THERAPY
Case Management Discharge Note    Phone # [phone]    Discharge Disposition: HO     Needs transportation assistance at DC: Family coming to get pt    Specialty Referrals:  F/u with local PCP    Support System Notified: Per pt    Narrative: CM spoke with pt at bedside, she states that she is ready to DC home today and her family is on their way from [place].  She states that they are bringing her inogen machine with them.  She denies any questions or concerns.

## 2022-06-21 NOTE — PROGRESS NOTES
"  [facility]    (formerly [facility])  [address] [place] [address]        Hospitalist/Internal Medicine - Progress Note        Lucretia Sands  :  1975   Age: 46 y.o.  MRN: [id number]   Code Status: Full Code  Primary Care Physician:  Pcp No   Admit Date:  2022    Date of Service: 2022     LOS: 4  Admitting Diagnosis: Chronic obstructive pulmonary disease with acute exacerbation (CMS/HCC) (HCC)      Patient Summary: Lucretia Sands is a 46 y.o. female with a PMHx of depression, anxiety, HTN, CKDz stage II and 1 LPM O2-dependent end-stage COPD who noted onset of \"puffy eyes\" on 2022.  Over the next few days she then developed a cough with clear phlegm production for which she took Mucinex, Tessalon and acetaminophen.  She continued her usual 1 LPM home O2 and nebulizer regimen.  By 6/15/2022 she began feeling worse and had increasing cough and SOB for which she initially presented to the [facility] in [place], [place] where she was in severe respiratory distress and became unarousable.  She was intubated prior to transport to this facility.  She was also hypotensive for which a Levophed gtt was started prior to transfer.  Need for higher level of care prompted air transport to our ED from where patient was subsequently admitted to the ICU on 2022 for further eval & Tx.  She was subsequently extubated and transferred to the medical floor where the Hospitalist service assumed care on 2022.    Pertinent pulmonary history obtained from patient.  Extensive records from [facility] system  including Cardiology, Rheumatology & Pulmonology evaluations.  Unclear etiology of end-stage of COPD.  No smoking Hx or inhalational injury Hx.  Had \"chronic pneumonia\" as a child.  Positive ERNA 1:160 speckled pattern, but otherwise entirely negative extensive rheumatologic evaluation cardiac cath showed normal coronary " arteries.  No angiographic response to inhaled nitric oxide.  Spirometry showed severe obstructive disease with no response to bronchodilators.  Moderately decreased diffusion capacity.  FEV1 0.8 (39% of predicted), FVC 1.6 (60% of predicted), DLCO 81%. CT scan negative for embolic disease.  PSG negative for sleep apnea.  Mild-moderate pulmonary HTN at 38 mmHg.  Tadalafil started.  Follows with Dr. Perales ([facility] Pulmonologist) in [place], [place].  Was told she might need a lung transplant.  Referred to [facility]; states she was told that she did not yet need a lung transplant and was referred to Pulmonary Rehab which she stopped after a month since she did not notice any benefit.  Had a previous severe exacerbation episode requiring intubation/mechanical ventilation in 2020 in [place], [place].      Hospital Problem List  Active Hospital Problems    Diagnosis Date Noted   • *Chronic obstructive pulmonary disease with acute exacerbation (CMS/[facility]) ([facility]) 06/16/2022     Priority: 1 - High   • Acute respiratory failure with hypoxia and hypercapnia (CMS/[facility]) ([facility]) 06/16/2022     Priority: 1 - High   • Chronic respiratory failure with hypoxia, on home O2 therapy (CMS/[facility]) ([facility]) 06/18/2022     Priority: 2      1 LPM baseline home O2     • Hypotension      Priority: 2    • Hyperparathyroidism (CMS/[facility]) ([facility]) 06/19/2022   • CKD (chronic kidney disease) stage 2, GFR 60-89 ml/min 06/18/2022   • Hypertension, essential    • Hypercalcemia       Resolved Hospital Problems    Diagnosis Date Noted Date Resolved   • Dysuria 06/18/2022 06/19/2022   • Acute kidney injury (CMS/[facility]) ([facility]) 06/16/2022 06/19/2022         Assessment & Plan  #Acute exacerbation of O2-dependent end-stage COPD  Pertinent pulmonary history obtained from patient.  Extensive records from [place], [place] hospital system 2020 including Cardiology, Rheumatology & Pulmonology evaluations.  Unclear etiology of end-stage of COPD.  No smoking Hx or inhalational  "injury Hx.  Had \"chronic pneumonia\" as a child.  Positive ERNA 1:160 speckled pattern, but otherwise entirely negative extensive rheumatologic evaluation cardiac cath showed normal coronary arteries.  No angiographic response to inhaled nitric oxide.  Spirometry showed severe obstructive disease with no response to bronchodilators.  Moderately decreased diffusion capacity.  FEV1 0.8 (39% of predicted), FVC 1.6 (60% of predicted), DLCO 81%. CT scan negative for embolic disease.  PSG negative for sleep apnea.  Mild-moderate pulmonary HTN at 38 mmHg.  Tadalafil started.  Follows with Dr. Perales ([facility] Pulmonologist) in [place], [place].  Was told she might need a lung transplant.  Referred to [facility]; states she was told that she did not yet need a lung transplant and was referred to Pulmonary Rehab which she stopped after a month since she did not notice any benefit.  Had a previous severe exacerbation episode requiring intubation/mechanical ventilation in 2020 in [place], [place].    6/15/2022 CT thorax w/contrast at outside facility negative for definitive focal infection   -New nodularity in peripheral segment of lateral RML   -Mosaic groundglass appearance of lungs increased from 5/23/2022   -Small right and trace left pleural effusions noted.   -Small areas of bilateral nodularity unchanged on 5/23/2022  CT C/A/P 6/16/2020 with similar findings; no obvious focal infection  Respiratory pathogen panel & COVID-19 PCR negative  Sputum culture & endotracheal culture negative  Blood culture NGTD  Empiric ceftriaxone & azithromycin started on admit (azithromycin included primarily for anti-inflammatory benefit)   -D/C ceftriaxone (received 2 days) as no clear-cut bacterial infection identified   -Azithromycin IV/oral 5-day course completed  Stop scheduled ipratropium/levalbuterol neb 3 times daily today but continue PRN nebs  D/C'd IV methylprednisolone and started prednisone 40 Mg daily; decrease to 20 Mg " daily  Continue scheduled guaifenesin  Assess ongoing O2 needs; currently at home baseline of 1 LPM    #Acute respiratory failure with hypoxia & hypercapnia  #Chronic respiratory failure with hypoxia on home O2  Due to, eval & Tx per COPD discussion  Baseline home O2 1 LPM  Assess ongoing O2 needs    #BNP elevation  Likely due to COPD exacerbation   -No suspicion for CHF as previously documented  Echo 6/16/2022 unremarkable.  Summary:  -LV normal size, thickness, wall motion and systolic/diastolic function with LVEF 73%  -RV normal size and systolic function  -LA mildly dilated.  RV normal size  Defer further cardiac eval    #Hypotension  Significant hypotension prompted starting Levophed gtt prior to departing [place], [place] facility  -Specifics unknown if BP occurred primarily or after receiving sedation/paralytics for elevation/mechanical ventilation  Hypotension generally resolved though with occasional BP drops to 87/37  6/20/2022 Had episode of rapid but brief symptomatic BP dropped down to 63/39 while sitting.  [place] lightheaded and had a slight headache.  Before intended IV fluid bolus could be given, symptoms resolved and BP rebounded markedly higher up to 179/114.  Of note, patient states she has a history of similar episodes like this in the past, and review of records from [place] confirms this.  See hypertension discussion    #HTN, essential  Initially hypotensive at [place], [place] facility requiring Levophed gtt; see that discussion  Very variable BPs initially upon hospital admission   -Accelerated BP up to max 195/117 on 6/17/2022   -Subsequent low BP at 63/39; see hypotension discussion  D/C usual home amlodipine 5 Mg daily given significant though brief hypotension.  Resume if indicated by subsequent BPs   -hold parameter for SBP <110 mmHg  D/C metoprolol XL at 12.5 Mg twice daily (lower than usual home dose of 25 Mg twice daily) given significant though brief hypotension.  Resume if indicated by  subsequent BPs   -Hold parameters for SBP <100 mmHg or heart rate <60 bpm  Monitor BPs    #Hypercalcemia  #Hyperparathyroidism, primary  Admit calcium 10.9.  Recheck calcium 11.2 (corrected calcium 11.1)  Elevated ionized calcium 1.37 (1.15-1.33) & elevated  (9-59)  Values c/w primary hyperparathyroidism  Discussion: After discussing results and diagnosis with patient, she recalls being told about this in the past.  Recommend follow-up with her PCP as well as a parathyroid scan which she does not think has been done yet.  Follow labs    #OTTONIEL on CKD stage II  Baseline renal function/lab values unknown  Admit creatinine 1.37  Creatinine normalized after initial IV fluids earlier this admission; IV fluids DC'd  Follow labs intermittently    #Dysuria  Due to Buitrago catheter removal with onset of slight dysuria immediately after Buitrago catheter removal  Unremarkable screening U/A  Already received ABX per COPD exacerbation discussion  Dysuria resolved    #DVT Prophylaxis:   Enoxaparin        Discussed with RN    Consultants:  CONSULT SEPSIS COORDINATOR  CONSULT TO INTENSIVIST  CONSULT TO SPIRITUAL CARE      Procedures:        Certification: Provider Inpatient Hospital Services Certification   Awaiting specific diagnostic testing and/or results that can impact the therapeutic care plan: Yes (6/18/2022  7:18 AM)  Current therapeutic care plan can only be provided in the hospital: Yes (6/18/2022  7:18 AM)  Current anti-infective care plan requires IV antibiotics that currently can only be gien in the inpatient setting: Yes (6/18/2022  7:18 AM)  Discharge expected in : Other (comments) (To be determined) (6/18/2022  7:18 AM)  Predicted/Planned discharge to: other (comments) (To be determined) (6/18/2022  7:18 AM)    Disposition: Pursue possible discharge home 6/21/2022 if stable        This document was created with the assistance of voice recognition software. I have reviewed the documentation and affirm that it  represents information collected by myself upon interviewing and examining the patient, the medical record and other available sources of information. While I have reviewed the content for accuracy, some typographical and dictation errors may exist.  Davidson Rubio MD 6/20/2022 8:29 PM  ___________________________________________________________________  Interval History: Feeling/breathing better in general today.  Still some SOB, but feels she is nearly back to baseline. Cough persists with more clear phlegm production.  Denies pain with breathing.  Brief episode of sudden and significant BP drop to 63/39 with lightheadedness and headache that rapidly resolved without intervention (see hypotension discussion).  Denies chest pain or palpitations.  No abdominal pain or N/V.    Allergies and labs reviewed  Scheduled Meds:predniSONE, 40 mg, oral, Daily  azithromycin, 500 mg, oral, Daily  ipratropium, 0.5 mg, nebulization, 3x daily  levalbuterol, 1.25 mg, nebulization, 3x daily  guaiFENesin, 600 mg, oral, 2x daily  Nozin Nasal Antiseptic POPSwab, 1 application, nasal, 2x daily  senna, 17.2 mg, oral, Nightly  enoxaparin, 40 mg, subcutaneous, Daily at 1400        Objective   Temp:  [36.8 °C (98.2 °F)-36.9 °C (98.5 °F)] 36.9 °C (98.4 °F)  Heart Rate:  [74-99] 98  Resp:  [16-18] 16  SpO2:  [93 %-100 %] 93 %  BP: ()/() 117/61  SpO2/FiO2 Ratio Using Approximate FiO2 (%):  [395.8-416.7] 400  Estimated P/F Ratio Using Approximate FiO2 (%):  [338.9-355.8] 342.3    Intake/Output last 3 shifts:  I/O last 3 completed shifts:  In: 1043 [P.O.:1043]  Out: 700 [Urine:700]  Intake/Output this shift:  I/O this shift:  In: 240 [P.O.:240]  Out: -     Constitutional  General Appearance:  Comfortable  Orientation:  Findings of: Alert and oriented x3    HEENT  Head:  Normocephalic/Atraumatic  Eyes:  Findings of: Conjunctivae Clear    Negative for: Scleral Icterus    Respiratory  Respiratory Effort:  Findings of: Normal     Negative for: Labored  Auscultation:  Findings of: Trivial residual coarseness of breath sounds at bilateral bases. Upper lung fields clear to Auscultation    Negative for: Crackles/Rales, Rhonchi, E-A changes, Wheezing    Cardiovascular  Heart Rate:  Regular Rate  Rhythm:  Regular  Heart Sounds:  Normal: S1, S2  Abnormal Heart Sounds:  Negative for: Murmur, Rub, S3 gallop, S4 gallop    Abdomen  Abdomen:  Findings of: Normal Appearance, Soft, Non-Tender    Negative for: Distension    Genitourinary  Urinary Catheter Present?:  No    Extremities/Musculoskeletal  Extremities:  Negative for: Clubbing, Cyanosis, Edema    Neurological  Responsiveness:  Awake, Alert  Cranial Nerves II-XII Intact:  Yes    Psychiatric  Cognition:  Findings of: Mood/Affect WNL, Judgment/Insight WNL        Diagnostic Findings:     LABS: Personally reviewed  Results from last 4 days   Lab Units 06/20/22  0505 06/19/22  0503 06/18/22  0732   WBC AUTO 10*3/uL 8.5 9.2 11.2*   HEMOGLOBIN g/dL 11.9 12.1 11.0*   HEMATOCRIT % 36.4 36.7 33.3*   PLATELETS AUTO 10*3/uL 213 212 195     Results from last 4 days   Lab Units 06/20/22  0505 06/19/22  0503 06/18/22  0732   SODIUM mmol/L 137 139 142   POTASSIUM MMOL/L 4.5 4.7 4.4   CHLORIDE mmol/L 101 100 102   CO2 mmol/L 30 32 32   BUN mg/dL 63* 58* 49*   CREATININE mg/dL 0.95 0.94 0.93   CALCIUM mg/dL 10.7* 10.8* 10.5*   GLUCOSE mg/dL 100 96 90

## 2022-06-21 NOTE — PLAN OF CARE
Problem: Infection Control  Goal: MINIMIZE THE ACQUISITION AND TRANSMISSION OF INFECTIOUS AGENTS  Description: INTERVENTIONS:  1. Isolate patient with suspected/diagnosed communicable disease  2. Place on designated isolation precautions  3. Maintain isolation techniques  4. Perform hand hygiene before and after each patient care activity  5. [place] universal precautions  6. Wear PPE as directed for type of isolation  7. Administer antibiotic therapy as ordered  8. Clean the environment appropriately after each patient use  9. Clean patient care equipment after each patient use as it leaves the room  10. Limit number of visitors, as appropriate  Outcome: Progressing     Problem: Knowledge Deficit  Goal: Patient/family/caregiver demonstrates understanding of disease process, treatment plan, medications, and discharge instructions  Description: INTERVENTIONS:   1. Complete learning assessment and assess knowledge base  2. Provide teaching at level of understanding   3. Provide teaching via preferred learning methods  Outcome: Progressing     Problem: Potential for Compromised Skin Integrity  Goal: Skin Integrity is Maintained or Improved  Description: INTERVENTIONS:  1. Assess and monitor skin integrity  2. Collaborate with interdisciplinary team and initiate plans and interventions as needed  3. Alternate a full bath with partial baths for elderly   4. Monitor patient's hygiene practices   5. Collaborate with wound, ostomy, and continence nurse  Outcome: Progressing  Goal: Nutritional status is improving  Description: INTERVENTIONS:  1. Monitor and assess patient for malnutrition (ex- brittle hair, bruises, dry skin, pale skin and conjunctiva, muscle wasting, smooth red tongue, and disorientation)  2. Monitor patient's weight and dietary intake as ordered or per policy  3. Determine patient's food preferences and provide high-protein, high-caloric foods as appropriate  4. Assist patient with eating   5. Allow  adequate time for meals   6. Encourage patient to take dietary supplement as ordered   7. Collaborate with dietitian  8. Include patient/family/caregiver in decisions related to nutrition  Outcome: Progressing  Goal: MOBILITY IS MAINTAINED OR IMPROVED  Description: INTERVENTIONS  1. Collaborate with interdisciplinary team and initiate plan and interventions as ordered (PT/OT)  2. Encourage ambulation  3. Up to chair for meals  4. Monitor for signs of deconditioning  Outcome: Progressing     Problem: Urinary Incontinence  Goal: Perineal skin integrity is maintained or improved  Description: INTERVENTIONS:  1. Assess genitourinary system, perineal skin, labs (urinalysis), and history of incontinence to include past management, aggravating, and alleviating factors  2. Collaborate with interdisciplinary team including wound, ostomy, and continence nurse and initiate plans and interventions as needed  4. Consider urine containment device  5. Apply skin protectant   6. Develop skin care regimen  7. Provide privacy when changing patient's incontinence device to maintain their dignity  Outcome: Progressing     Problem: Safety Adult - Fall  Goal: Free from fall injury  Description: INTERVENTIONS:    Inpatient - Please reference Cares/Safety Flowsheet under [place] Fall Risk for interventions.  Pediatrics - Please reference Peds Daily Cares/Safety Flowsheet under [place] Pediatric Fall Assessment Fall Bundle for interventions  LD/OB - Please reference OB Shift Screening Flowsheet under OB Fall Risk for interventions.  Outcome: Progressing     Problem: Pain - Adult  Goal: Verbalizes/displays adequate comfort level or baseline comfort level  Description: INTERVENTIONS:  1. Encourage patient to monitor pain and request interventions  2. Assess pain using the appropriate pain scale  3. Administer analgesics based on type and severity of pain and evaluate response  4. Educate/Implement non-pharmacological measures as appropriate  and evaluate response  5. Consider cultural, developmental and social influences on pain and pain management  6. Notify Provider if interventions unsuccessful or patient reports new pain  Outcome: Progressing     Problem: Infection - Adult  Goal: Absence of infection during hospitalization  Description: INTERVENTIONS:  1. Assess and monitor for signs and symptoms of infection  2. Monitor lab/diagnostic results  3. Monitor all insertion sites/wounds/incisions  4. Monitor secretions for changes in amount and color  5. Administer medications as ordered  6. Educate and encourage patient and family to use good hand hygiene technique  7. Identify and educate in appropriate isolation precautions for identified infection/condition  Outcome: Progressing     Problem: Safety Adult  Goal: Patient will remain safe during hospitalization  Description: INTERVENTIONS    1. Assess patient for fall risk and implement interventions if needed  2. Use safe transport techniques  3. Assess patient using the appropriate Jarred skin assessment scale  4. Assess patient for risk of aspiration  5. Assess patient for risk of elopement  6. Assess patient for risk of suicide  Outcome: Progressing     Problem: Daily Care  Goal: Daily care needs are met  Description: INTERVENTIONS:   1. Assess and monitor skin integrity  2. Identify patients at risk for skin breakdown on admission and per policy  3. Assess and monitor ability to perform self care and identify potential discharge needs  4. Assess skin integrity/risk for skin breakdown  5. Assist patient with activities of daily living as needed  6. Encourage independent activity per ability   7. Provide mouth care   8. Include patient/family/caregiver in decisions related to daily care   Outcome: Progressing     Problem: Discharge Barriers  Goal: Patient's discharge needs are met  Description: INTERVENTIONS:  1. Assess patient for self-management skills  2. Encourage participation in management  3.  Identify potential discharge barriers on admission and throughout hospital stay  4. Involve patient/family/caregiver in discharge planning process  5. Collaborate with case management/ for discharge needs  Outcome: Progressing     Problem: Safety - Medical Restraint  Goal: Remains free of injury from restraints (Restraint for Interference with Medical Device)  Description: INTERVENTIONS:  1. Determine that other, less restrictive measures have been tried or would not be effective before applying the restraint  2. Evaluate the patient's condition at the time of restraint application  3. Inform patient/family regarding the reason for restraint  4. Monitor safety, psychosocial status, comfort, nutrition and hydration  5. Assess continued need for restraints  6. Identify and implement measures to help patient regain control  Outcome: Progressing     Problem: PT Misc  Goal: STG - Misc 1  Outcome: Progressing  Goal: STG - Misc 2  Outcome: Progressing     Problem: Balance  Goal: STG - Maintains static sitting balance with upper extremity support  Description: X 5 minutes w/ Mod A  in prep for ADLs at EOB  Outcome: Progressing     Problem: OT Misc  Goal: STG - Misc 1  Description: Pt will follow 1 step commands w/ 75% accuracy in order to complete hand/face hygiene w/ min cues..  Outcome: Progressing     Problem: Respiratory - Adult  Goal: Achieves optimal ventilation and oxygenation  Description: INTERVENTIONS:  1. Assess for changes in respiratory status  2. Assess for changes in mentation and behavior  3. Position to facilitate oxygenation and minimize respiratory effort  4. Oxygen supplementation based on oxygen saturation or ABGs  5. Assess patient's ability to cough effectively  6. Encourage broncho-pulmonary hygiene including cough, deep breathe  7. Assess the need for suctioning   8. Assess and instruct to report SOB or any respiratory difficulty  9. Respiratory Therapy support as indicated, including  medications and treatment.  Outcome: Progressing

## 2022-06-21 NOTE — PLAN OF CARE
Problem: Infection Control  Goal: MINIMIZE THE ACQUISITION AND TRANSMISSION OF INFECTIOUS AGENTS  Description: INTERVENTIONS:  1. Isolate patient with suspected/diagnosed communicable disease  2. Place on designated isolation precautions  3. Maintain isolation techniques  4. Perform hand hygiene before and after each patient care activity  5. [place] universal precautions  6. Wear PPE as directed for type of isolation  7. Administer antibiotic therapy as ordered  8. Clean the environment appropriately after each patient use  9. Clean patient care equipment after each patient use as it leaves the room  10. Limit number of visitors, as appropriate  Outcome: Progressing     Problem: Knowledge Deficit  Goal: Patient/family/caregiver demonstrates understanding of disease process, treatment plan, medications, and discharge instructions  Description: INTERVENTIONS:   1. Complete learning assessment and assess knowledge base  2. Provide teaching at level of understanding   3. Provide teaching via preferred learning methods  Outcome: Progressing     Problem: Potential for Compromised Skin Integrity  Goal: Skin Integrity is Maintained or Improved  Description: INTERVENTIONS:  1. Assess and monitor skin integrity  2. Collaborate with interdisciplinary team and initiate plans and interventions as needed  3. Alternate a full bath with partial baths for elderly   4. Monitor patient's hygiene practices   5. Collaborate with wound, ostomy, and continence nurse  Outcome: Progressing  Goal: Nutritional status is improving  Description: INTERVENTIONS:  1. Monitor and assess patient for malnutrition (ex- brittle hair, bruises, dry skin, pale skin and conjunctiva, muscle wasting, smooth red tongue, and disorientation)  2. Monitor patient's weight and dietary intake as ordered or per policy  3. Determine patient's food preferences and provide high-protein, high-caloric foods as appropriate  4. Assist patient with eating   5. Allow  adequate time for meals   6. Encourage patient to take dietary supplement as ordered   7. Collaborate with dietitian  8. Include patient/family/caregiver in decisions related to nutrition  Outcome: Progressing  Goal: MOBILITY IS MAINTAINED OR IMPROVED  Description: INTERVENTIONS  1. Collaborate with interdisciplinary team and initiate plan and interventions as ordered (PT/OT)  2. Encourage ambulation  3. Up to chair for meals  4. Monitor for signs of deconditioning  Outcome: Progressing     Problem: Urinary Incontinence  Goal: Perineal skin integrity is maintained or improved  Description: INTERVENTIONS:  1. Assess genitourinary system, perineal skin, labs (urinalysis), and history of incontinence to include past management, aggravating, and alleviating factors  2. Collaborate with interdisciplinary team including wound, ostomy, and continence nurse and initiate plans and interventions as needed  4. Consider urine containment device  5. Apply skin protectant   6. Develop skin care regimen  7. Provide privacy when changing patient's incontinence device to maintain their dignity  Outcome: Progressing     Problem: Safety Adult - Fall  Goal: Free from fall injury  Description: INTERVENTIONS:    Inpatient - Please reference Cares/Safety Flowsheet under [place] Fall Risk for interventions.  Pediatrics - Please reference Peds Daily Cares/Safety Flowsheet under [place] Pediatric Fall Assessment Fall Bundle for interventions  LD/OB - Please reference OB Shift Screening Flowsheet under OB Fall Risk for interventions.  Outcome: Progressing     Problem: Pain - Adult  Goal: Verbalizes/displays adequate comfort level or baseline comfort level  Description: INTERVENTIONS:  1. Encourage patient to monitor pain and request interventions  2. Assess pain using the appropriate pain scale  3. Administer analgesics based on type and severity of pain and evaluate response  4. Educate/Implement non-pharmacological measures as appropriate  and evaluate response  5. Consider cultural, developmental and social influences on pain and pain management  6. Notify Provider if interventions unsuccessful or patient reports new pain  Outcome: Progressing     Problem: Infection - Adult  Goal: Absence of infection during hospitalization  Description: INTERVENTIONS:  1. Assess and monitor for signs and symptoms of infection  2. Monitor lab/diagnostic results  3. Monitor all insertion sites/wounds/incisions  4. Monitor secretions for changes in amount and color  5. Administer medications as ordered  6. Educate and encourage patient and family to use good hand hygiene technique  7. Identify and educate in appropriate isolation precautions for identified infection/condition  Outcome: Progressing     Problem: Safety Adult  Goal: Patient will remain safe during hospitalization  Description: INTERVENTIONS    1. Assess patient for fall risk and implement interventions if needed  2. Use safe transport techniques  3. Assess patient using the appropriate Jarred skin assessment scale  4. Assess patient for risk of aspiration  5. Assess patient for risk of elopement  6. Assess patient for risk of suicide  Outcome: Progressing     Problem: Daily Care  Goal: Daily care needs are met  Description: INTERVENTIONS:   1. Assess and monitor skin integrity  2. Identify patients at risk for skin breakdown on admission and per policy  3. Assess and monitor ability to perform self care and identify potential discharge needs  4. Assess skin integrity/risk for skin breakdown  5. Assist patient with activities of daily living as needed  6. Encourage independent activity per ability   7. Provide mouth care   8. Include patient/family/caregiver in decisions related to daily care   Outcome: Progressing     Problem: Discharge Barriers  Goal: Patient's discharge needs are met  Description: INTERVENTIONS:  1. Assess patient for self-management skills  2. Encourage participation in management  3.  Identify potential discharge barriers on admission and throughout hospital stay  4. Involve patient/family/caregiver in discharge planning process  5. Collaborate with case management/ for discharge needs  Outcome: Progressing     Problem: Safety - Medical Restraint  Goal: Remains free of injury from restraints (Restraint for Interference with Medical Device)  Description: INTERVENTIONS:  1. Determine that other, less restrictive measures have been tried or would not be effective before applying the restraint  2. Evaluate the patient's condition at the time of restraint application  3. Inform patient/family regarding the reason for restraint  4. Monitor safety, psychosocial status, comfort, nutrition and hydration  5. Assess continued need for restraints  6. Identify and implement measures to help patient regain control  Outcome: Progressing     Problem: PT Misc  Goal: STG - Misc 1  Outcome: Progressing  Goal: STG - Misc 2  Outcome: Progressing     Problem: Balance  Goal: STG - Maintains static sitting balance with upper extremity support  Description: X 5 minutes w/ Mod A  in prep for ADLs at EOB  Outcome: Progressing     Problem: OT Misc  Goal: STG - Misc 1  Description: Pt will follow 1 step commands w/ 75% accuracy in order to complete hand/face hygiene w/ min cues..  Outcome: Progressing     Problem: Respiratory - Adult  Goal: Achieves optimal ventilation and oxygenation  Description: INTERVENTIONS:  1. Assess for changes in respiratory status  2. Assess for changes in mentation and behavior  3. Position to facilitate oxygenation and minimize respiratory effort  4. Oxygen supplementation based on oxygen saturation or ABGs  5. Assess patient's ability to cough effectively  6. Encourage broncho-pulmonary hygiene including cough, deep breathe  7. Assess the need for suctioning   8. Assess and instruct to report SOB or any respiratory difficulty  9. Respiratory Therapy support as indicated, including  medications and treatment.  Outcome: Progressing

## 2022-06-21 NOTE — DISCHARGE INSTRUCTIONS
Hospitalist Discharge Recommendations:  As discussed, you should check and record your blood pressure and heart rate frequently.  Bring readings to follow-up visits with all of your care providers.    2.  As discussed, you need to follow-up with your PCP and/or Endocrinologist (primary care provider) regarding hyperparathyroidism.

## 2022-06-22 ENCOUNTER — PATIENT OUTREACH (OUTPATIENT)
Dept: FAMILY MEDICINE | Facility: CLINIC | Age: 47
End: 2022-06-22
Payer: MEDICARE

## 2022-06-22 PROBLEM — J96.01 ACUTE RESPIRATORY FAILURE WITH HYPOXIA AND HYPERCAPNIA (CMS/HCC): Status: RESOLVED | Noted: 2022-06-16 | Resolved: 2022-06-22

## 2022-06-22 PROBLEM — J96.02 ACUTE RESPIRATORY FAILURE WITH HYPOXIA AND HYPERCAPNIA (CMS/HCC): Status: RESOLVED | Noted: 2022-06-16 | Resolved: 2022-06-22

## 2022-06-22 PROBLEM — J44.1 CHRONIC OBSTRUCTIVE PULMONARY DISEASE WITH ACUTE EXACERBATION (CMS/HCC): Status: RESOLVED | Noted: 2022-06-16 | Resolved: 2022-06-22

## 2022-06-22 NOTE — PROGRESS NOTES
Lucretia Sands was contacted following recent hospitalization at [facility]. The patient was discharged from the hospital on 6/21/2022 .The Discharge Summary and After Visit Summary were reviewed. The patient's main diagnosis during the hospitalization was chronic bronchitis.  Followup appointment:.is to be made by pt with PCP in [place] Any additional testing and labs will be discussed at the patient's upcoming post-hospital follow up appointment.    Transitional Care Management Follow Up (most recent)     Transitional Care Management - 06/22/22 1327        OTHER    Date of post hospital outreach 06/22/22     Contact Status Contact done     Speaking with the Patient or Patient's Caregiver? Patient     Is the patient on the Diabetes registry? N     Were patient's home medications changed or did they have any new medications added during the hospitalization? Y     Did someone go over the discharge summary with the patient or the patient's caregiver and discuss the medications listed on it? Y     Does the patient or patient's caregiver have any questions about the medication changes? no     Patient verbalized understanding of when to contact health care provider or when to get help right away? Y     Discharge instructions reviewed with patient or patient's caregiver and all questions answered? Y     Is there a Home Health/DME Plan being enacted? Please note name of HH agency, DME vendor, contacted/received N     Does patient have psychosocial issues that might impact their health status? None     Does patient have financial barriers to care? None                  Evy Harrell RN  June 22, 2022 1:34 PM

## 2022-06-23 NOTE — DISCHARGE SUMMARY
"  [facility]      (formerly [facility])  [address] [place] [address]      Hospitalist/Internal Medicine - Discharge Summary          Patient Name: Lucretia Sands  : 1975  Medical Record Number: [id number]  Primary Care Provider: Pcp No  Admit Date: 2022  Discharge Date: 2022  Length of Stay: 5  Admitting Provider: Lashon Ruiz MD Discharge Provider: Davidson Rubio MD    Admitting Diagnosis: Chronic obstructive pulmonary disease with acute exacerbation (CMS/HCC) (HCC)      Final Diagnoses:   1.  Acute exacerbation of end-stage O2 dependent COPD  2.  Acute respiratory failure with hypoxia and hypercapnia  3.  Chronic respiratory failure with hypoxia on home O2 therapy  4.  Hypotension  5.  Hyperparathyroidism      Brief History: See H&P for full details. Lucretia Sands is a 46 y.o. female with a PMHx of depression, anxiety, HTN, CKDz stage II and 1 LPM O2-dependent end-stage COPD who noted onset of \"puffy eyes\" on 2022.  Over the next few days she then developed a cough with clear phlegm production for which she took Mucinex, Tessalon and acetaminophen.  She continued her usual 1 LPM home O2 and nebulizer regimen.  By 6/15/2022 she began feeling worse and had increasing cough and SOB for which she initially presented to the [facility] in [place], [place] where she was in severe respiratory distress and became unarousable.  She was intubated prior to transport to this facility.  She was also hypotensive for which a Levophed gtt was started prior to transfer.  Need for higher level of care prompted air transport to our ED from where patient was subsequently admitted to the ICU on 2022 for further eval & Tx.  She was subsequently extubated and transferred to the medical floor where the Hospitalist service assumed care on 2022.     Pertinent pulmonary history obtained from patient.  Extensive records from " "[place], [place] hospital system 2020 including Cardiology, Rheumatology & Pulmonology evaluations.  Unclear etiology of end-stage of COPD.  No smoking Hx or inhalational injury Hx.  Had \"chronic pneumonia\" as a child.  Positive ERNA 1:160 speckled pattern, but otherwise entirely negative extensive rheumatologic evaluation cardiac cath showed normal coronary arteries.  No angiographic response to inhaled nitric oxide.  Spirometry showed severe obstructive disease with no response to bronchodilators.  Moderately decreased diffusion capacity.  FEV1 0.8 (39% of predicted), FVC 1.6 (60% of predicted), DLCO 81%. CT scan negative for embolic disease.  PSG negative for sleep apnea.  Mild-moderate pulmonary HTN at 38 mmHg.  Tadalafil started.  Follows with Dr. Perales ([facility] Pulmonologist) in [place], [place].  Was told she might need a lung transplant.  Referred to [facility]; states she was told that she did not yet need a lung transplant and was referred to Pulmonary Rehab which she stopped after a month since she did not notice any benefit.  Had a previous severe exacerbation episode requiring intubation/mechanical ventilation in 2020 in [place], [place].      Hospital Problem List:   Active Hospital Problems    Diagnosis Date Noted   • *Chronic obstructive pulmonary disease with acute exacerbation (CMS/[facility]) ([facility]) 06/16/2022     Priority: 1 - High   • Acute respiratory failure with hypoxia and hypercapnia (CMS/[facility]) ([facility]) 06/16/2022     Priority: 1 - High   • Chronic respiratory failure with hypoxia, on home O2 therapy (CMS/[facility]) ([facility]) 06/18/2022     Priority: 2      1 LPM baseline home O2     • Hypotension      Priority: 2    • Hyperparathyroidism (CMS/[facility]) ([facility]) 06/19/2022   • CKD (chronic kidney disease) stage 2, GFR 60-89 ml/min 06/18/2022   • Hypertension, essential    • Hypercalcemia       Resolved Hospital Problems    Diagnosis Date Noted Date Resolved   • Dysuria 06/18/2022 06/19/2022   • Acute kidney injury " "(CMS/[facility]) ([facility]) 06/16/2022 06/19/2022       Hospital Course by Problem List:   #Acute exacerbation of O2-dependent end-stage COPD  Pertinent pulmonary history obtained from patient.  Extensive records from [place], [place] hospital system 2020 including Cardiology, Rheumatology & Pulmonology evaluations.  Unclear etiology of end-stage of COPD.  No smoking Hx or inhalational injury Hx.  Had \"chronic pneumonia\" as a child.  Positive ERNA 1:160 speckled pattern, but otherwise entirely negative extensive rheumatologic evaluation cardiac cath showed normal coronary arteries.  No angiographic response to inhaled nitric oxide.  Spirometry showed severe obstructive disease with no response to bronchodilators.  Moderately decreased diffusion capacity.  FEV1 0.8 (39% of predicted), FVC 1.6 (60% of predicted), DLCO 81%. CT scan negative for embolic disease.  PSG negative for sleep apnea.  Mild-moderate pulmonary HTN at 38 mmHg.  Tadalafil started.  Follows with Dr. Perales ([facility] Pulmonologist) in [place], [place].  Was told she might need a lung transplant.  Referred to [facility]; states she was told that she did not yet need a lung transplant and was referred to Pulmonary Rehab which she stopped after a month since she did not notice any benefit.  Had a previous severe exacerbation episode requiring intubation/mechanical ventilation in 2020 in [place], [place].     6/15/2022 CT thorax w/contrast at outside facility negative for definitive focal infection              -New nodularity in peripheral segment of lateral RML              -Mosaic groundglass appearance of lungs increased from 5/23/2022              -Small right and trace left pleural effusions noted.              -Small areas of bilateral nodularity unchanged on 5/23/2022  CT C/A/P 6/16/2020 with similar findings; no obvious focal infection  Respiratory pathogen panel & COVID-19 PCR negative  Sputum culture & endotracheal culture negative  Blood culture  " negative  Empiric ceftriaxone & azithromycin started on admit (azithromycin included primarily for anti-inflammatory benefit)              -D/C ceftriaxone (received 2 days) as no clear-cut bacterial infection identified              -Azithromycin IV/oral 5-day course completed  Scheduled ipratropium/levalbuterol neb 3 times daily  D/C'd with clinical improvement, but continued PRN nebs  D/C'd IV methylprednisolone and started prednisone 40 Mg daily.  Rapid taper then stop as wheezing rapidly resolved  Continued guaifenesin  Assessed ongoing O2 needs; at home baseline of 1 LPM at discharge  Trial of low-dose PRN lorazepam at discharge for anxiety associated with dyspnea & BP excursions     #Acute respiratory failure with hypoxia & hypercapnia  #Chronic respiratory failure with hypoxia on home O2  Due to, eval & Tx per COPD discussion  Baseline home O2 1 LPM at discharge     #BNP elevation  Likely due to COPD exacerbation              -No suspicion for CHF as previously documented  Echo 6/16/2022 unremarkable.  Details below. Summary:  -LV normal size, thickness, wall motion and systolic/diastolic function with LVEF 73%  -RV normal size and systolic function  -LA mildly dilated.  RV normal size  Deferred further cardiac eval     #Hypotension  Significant hypotension prompted starting Levophed gtt prior to departing [place] facility  -Specifics unknown if BP occurred primarily or after receiving sedation/paralytics for elevation/mechanical ventilation  Hypotension generally resolved though with occasional BP drops to 87/37  6/20/2022 Had episode of rapid but brief symptomatic BP dropped down to 63/39 while sitting.  Vida lightheaded and had a slight headache.  Before intended IV fluid bolus could be given, symptoms resolved and BP rebounded markedly higher up to 179/114.  Of note, patient states she has a history of similar episodes like this in the past, and review of records from [place] confirms this.  See  "hypertension discussion     #HTN, essential  Initially hypotensive at [place], [place] facility requiring Levophed gtt; see that discussion  Very variable BPs initially upon hospital admission              -Accelerated BP up to max 195/117 on 6/17/2022              -Subsequent low BP at 63/39; see hypotension discussion  Briefly held metoprolol XL at 12.5 Mg twice daily (lower than usual home dose of 25 Mg twice daily) given significant though brief hypotension.  Resumed as Bps  Held usual home amlodipine 5 Mg daily given significant though brief hypotension; see that discussion   -Final BPs \"soft\" in the  mmHg systolic range.  Patient given instruction to hold her usual amlodipine, though \"hold\" is not on option that can be selected to prepare the med list below.  Instead, patient given instruction to check and record frequent blood pressure and heart rate readings at home.  She is to bring those readings to follow-up visits with all of her care providers.  If her blood pressure are more elevated, consideration could be given to resuming the amlodipine, perhaps at a lower dose.     #Hypercalcemia  #Hyperparathyroidism, primary  Admit calcium 10.9.  Recheck calcium 11.2 (corrected calcium 11.1)  Elevated ionized calcium 1.37 (1.15-1.33) & elevated  (9-59)  Values c/w primary hyperparathyroidism  Discussion: After discussing results and diagnosis with patient, she states she has been told about this in the past. Recommend follow-up with her PCP with consideration for possible referral for surgical evaluation     #OTTONIEL on CKD stage II  Baseline renal function/lab values unknown  Admit creatinine 1.37  Final creatinine normalized at 0.95 after initial IV fluids earlier this admission     #Dysuria  Due to Buitrago catheter removal with onset of slight dysuria immediately after Buitrago catheter removal  Unremarkable screening U/A  Already received ABX per COPD exacerbation discussion  Dysuria " resolved        Consultants:  None    Procedures:  None      Condition at Discharge: Stable      Final Exam:  Constitutional  General Appearance:  Comfortable  Orientation:  Findings of: Alert and oriented x3     HEENT  Head:  Normocephalic/Atraumatic  Eyes:  Findings of: Conjunctivae Clear               Negative for: Scleral Icterus     Respiratory  Respiratory Effort:  Findings of: Normal               Negative for: Labored  Auscultation:  Findings of: Trivial residual coarseness of breath sounds at bilateral bases. Upper lung fields clear to Auscultation               Negative for: Crackles/Rales, Rhonchi, E-A changes, Wheezing     Cardiovascular  Heart Rate:  Regular Rate  Rhythm:  Regular  Heart Sounds:  Normal: S1, S2  Abnormal Heart Sounds:  Negative for: Murmur, Rub, S3 gallop, S4 gallop     Abdomen  Abdomen:  Findings of: Normal Appearance, Soft, Non-Tender               Negative for: Distension     Extremities/Musculoskeletal  Extremities:  Negative for: Clubbing, Cyanosis, Edema     Neurological  Responsiveness:  Awake, Alert  Cranial Nerves II-XII Intact:  Yes     Psychiatric  Cognition:  Findings of: Mood/Affect WNL, Judgment/Insight WNL      Disposition/Plans for Post-Hospital Care/Assistance: 01 - Home or Self-Care    Outpatient Follow-Up    [facility] [address] [id number] [phone]  Go on 6/28/2022  Post hospital follow up. Checking in at 9:30am.    If this appointment doesn't work please call [phone] to cancel/reschedule appointment    Pcp No            Active Issues Requiring Follow-up  1.  Blood pressure monitoring and follow-up per above hypotension/hypertension discussions  2.  Follow-up with PCP for hyperparathyroidism discussed above    Test Results Pending at Discharge  None      Other pending results:  None    Medication Instructions Given to the Patient at Discharge:  Discharge Medication List as of 6/21/2022 12:39 PM       START taking these medications    Details   LORazepam (ATIVAN) 0.5 mg tablet Take 0.5 tablets (0.25 mg total) by mouth 2 (two) times a day as needed for anxiety Max Daily Amount: 0.5 mg, Starting Tue 6/21/2022, Print           Discharge Medication List as of 6/21/2022 12:39 PM      CONTINUE these medications which have NOT CHANGED    Details   ipratropium-albuteroL (DUO-NEB) 0.5-2.5 mg/3 mL nebulizer solution Take 3 mL by nebulization every 6 (six) hours, Historical Med      fluticasone propion-salmeteroL (ADVAIR DISKUS) 250-50 mcg/dose diskus inhaler Inhale 1 puff 2 (two) times a day Rinse mouth with water after use. Do not swallow., Historical Med           Discharge Medication List as of 6/21/2022 12:39 PM      CONTINUE these medications which have CHANGED    Details   albuterol HFA (ProAir HFA) 90 mcg/actuation inhaler Inhale 2 puffs every 4 (four) hours as needed for wheezing Inhale 1 to 2 puffs every 4 to 6 hours as needed, Starting Tue 6/21/2022, Print      metoprolol succinate XL (TOPROL-XL) 25 mg 24 hr tablet Take 0.5 tablets (12.5 mg total) by mouth daily, Starting Tue 6/21/2022, Print           Discharge Medication List as of 6/21/2022 12:39 PM      STOP taking these medications       amLODIPine (NORVASC) 5 mg tablet Comments:   Reason for Stopping:                 Diet: regular diet    Pertinent Diagnostic Results:  US Echo complete  Result Date: 6/16/2022  Narrative: · Left ventricle is normal in size with normal wall thickness. · Left ventricle systolic function is normal with ejection fraction of 73% by biplane Frias method.  Normal wall motion. · Left ventricle diastolic function is normal. · Right ventricle is normal in size and normal systolic function. · Left atrium is mildly dilated and right atria is normal in size. · TR jet is inadequate to estimate pulm artery systolic pressure. · There is no evidence of pericardial effusion.    XR chest portable 1 view  Result Date: 6/16/2022  Narrative:  Exam: Portable chest, single view on 06/16/2022 at 0319 hours Clinical History: Shortness of breath Comparison(s): There are no previous comparable exams. Findings: The tip of the ET tube is in the right mainstem bronchus. NG tube is in the stomach. There is dense consolidation in the mid and left lower lung. Heterogeneous patchy areas of density are seen in the bilateral upper lobes and right lower lung. Heart size assessment is limited by the left basilar consolidation. No visible pneumothoraces.   Impression: IMPRESSION: 1.  Right main stem location of the ET tube. Subsequent CT shows repositioning. 2.  Dense left mid and lower lung consolidation and patchy infiltrates throughout the remaining lung.    CT CHEST ABDOMEN PELVIS WO IV CONTRAST No Oral Contrast  Result Date: 6/16/2022  Narrative: Exam: CT of the chest, abdomen and pelvis without contrast on 06/16/2022 at 0014 hours Clinical History: Sepsis Comparison(s): Chest x-ray from 6/16/2022 Technique: Helical axial imaging was performed through the chest, abdomen and pelvis without intravenous contrast administration.  Axial reconstructions and multiplanar reformations were constructed and reviewed.  Oral enteric contrast was not administered. Dose reduction technique utilized; automatic/anatomic modulation of X-ray tube current (Auto mA). Findings: CT chest: Mediastinum: The mediastinal vascular structures are unremarkable for technique. There are no mediastinal masses and there is no lymphadenopathy evident. There are no coronary artery wall calcifications. Lungs and pleura: There are mosaic attenuation density changes seen throughout the lungs, upper greater than lower lobes. Small patchy areas of airspace density changes are superimposed in the left upper lobe. There is near complete atelectasis of the left lower lobe. There are atelectatic changes of the dependent basilar segments of the right lower lobe. There is a tiny right pleural effusion. Airway:  The trachea and bronchi are unremarkable demonstrating no masses, filling defects, or strictures. There is no bronchiectasis. ET tube and NG tube are in satisfactory position. Chest wall and axillary regions: There are no chest wall masses or inflammatory changes. There is no axillary lymphadenopathy. There is an age appropriate appearance of the dorsal spine. CT abdomen: The liver is unremarkable in appearance for noncontrast technique demonstrating no evidence of mass or ductal dilatation. The gallbladder contains dependent hyperdense fluid, probable vicarious contrast excretion. There is no splenomegaly. The pancreas is unremarkable. The adrenal glands appear mildly thickened. The kidneys demonstrate a symmetric cortical increased density, probable contrast retention related to ATN. Increased fluid content seen within the cecum and ascending colon. No dilated or thickened segments of bowel otherwise. The abdominal aorta and retroperitoneal structures are unremarkable. CT pelvis: There are no pericecal inflammatory changes. The uterus is not seen. There is a Buitrago catheter in the bladder. No inguinal lymphadenopathy. Osseous structures are unremarkable.   Impression: Impression: 1.  Mosaic attenuation density changes of the lungs can be seen with small airways disease and air trapping. Vascular occlusive disease and respiratory bronchiolitis are in the differential. 2.  Small areas of superimposed airspace density in the left upper lobe and pattern that can be seen with aspiration pneumonitis. Other infectious pneumonitis changes cannot be excluded. 3.  Consolidation of the left greater than right lower lobes in a pattern most suggestive of atelectasis. Superimposed infiltrate is difficult to exclude. 4.  Small right pleural effusion. 5.  Appearance of the kidneys suggestive of ATN. 6.  Otherwise unremarkable noncontrast CT appearance of the upper abdomen. 7.  Nonspecific increased fluid content within the cecum  and ascending colon. No thickened bowel or obstructive changes otherwise. 8.  Negative pelvis.           Care ProviderPcp No was notified of the admission and DC plan  Total time on evaluation of the patient and arrangement of discharge was 38 minutes    Davidson Rubio MD  6/22/2022  10:12 PM  This document was created with the assistance of voice recognition software. I have reviewed the documentation and affirm that it represents information collected by myself upon interviewing and examining the patient. While I have reviewed the content for accuracy, some typographical and dictation errors may exist.    Copies of this summary should be routed to the following:  Primary Care Provider: Pcp No

## 2022-09-18 ENCOUNTER — HEALTH MAINTENANCE LETTER (OUTPATIENT)
Age: 47
End: 2022-09-18

## 2022-10-04 ENCOUNTER — TELEPHONE (OUTPATIENT)
Dept: PULMONOLOGY | Facility: CLINIC | Age: 47
End: 2022-10-04

## 2022-10-24 ENCOUNTER — TELEPHONE (OUTPATIENT)
Dept: PULMONOLOGY | Facility: CLINIC | Age: 47
End: 2022-10-24

## 2022-12-21 ENCOUNTER — TELEPHONE (OUTPATIENT)
Dept: PULMONOLOGY | Facility: CLINIC | Age: 47
End: 2022-12-21

## 2022-12-21 NOTE — TELEPHONE ENCOUNTER
Spoke with pt after she left [facility] regarding whether records were received. Informed her no records were received and provided her fax number to send records she'd like Dr. Sanchez to review.

## 2023-01-29 ENCOUNTER — HEALTH MAINTENANCE LETTER (OUTPATIENT)
Age: 48
End: 2023-01-29

## 2023-05-08 ENCOUNTER — HEALTH MAINTENANCE LETTER (OUTPATIENT)
Age: 48
End: 2023-05-08

## 2024-01-01 NOTE — INTERDISCIPLINARY/THERAPY
NUTRITION ASSESSMENT:    MALNUTRITION:  Parameters for Malnutrition: Adequate nutrition  Eating well through 6/15 per pt; states thinks she was 148lb at Dr office in past week but not sure (doubt accurate as only NPO x 2 days and wt down to 136lb - also ? some fluid changes-pt was not sure of her UBW)     OTHER NUTRITION PROBLEMS:   COPD exacerbation - eats poorly when ill per pt but was eating up through 6/15 before hospitalized    NUTRITION INTERVENTIONS:  6/17: Start po diet as able post extubation    Suggest 2 gm Na or no added salt packet diet restriction with edema     Discharge nutrition recommendations Regular diet, suggest BRADY  _______________________________________________________________________________  NUTRITION ASSESSMENT/REASSESSMENT DATA    PERTINENT MEDICAL DIAGNOSIS/PROBLEMS:     Current Diagnosis: hypoxic hypercapnic respiratory failure due to acute exacerbation of COPD, CLD, OTTONIEL  Patient Active Problem List   Diagnosis   • Acute respiratory failure with hypoxia and hypercapnia (CMS/HCC) ([facility])   • Acute kidney injury (CMS/HCC) ([facility])   • Chronic obstructive pulmonary disease with acute exacerbation (CMS/HCC) ([facility])   • Primary hypertension      PMH:     Past Medical History:   Diagnosis Date   • Anxiety    • COPD (chronic obstructive pulmonary disease) (CMS/HCC) ([facility])    • Depression    • Hypertension    • Renal disorder     History reviewed. No pertinent surgical history.            NUTRITION PRESCRIPTION:  Total Energy Estimated Needs: 25-30 cals/kg IBW = 9394-0706 cals  Total Protein Estimated Needs: 1-1.2g PRO/kg IBW =46-55g  Total Fluid Estimated Needs: 30ml/kg adjusted IBW = 1500ml         Dietary Orders   (From admission, onward)             Start     Ordered    06/16/22 0400  NPO Diet  Diet effective now         06/16/22 0408              MONITORING/EVALUATION:  Pertinent Info: ICU, extubated this am, End stage lung disease, report of poor intake when ill  Neuro:  A&O, follows  commands  Intake:   Average Percent Meals Eaten (%): 0 Avg %     GI:   Stool PTA  Wounds:  none  Pertinent Meds:  Zithromax, Rocephin, Aspart Correction, SoluMedrol, Senna  Labs:    Results from last 4 days   Lab Units 06/17/22  1109 06/16/22  0346   POTASSIUM MMOL/L 4.2 4.5   CHLORIDE mmol/L 98 102   SODIUM mmol/L 140 141   BUN mg/dL 49* 43*   CREATININE mg/dL 1.14* 1.37*   CO2 mmol/L 33* 31   HEMOGLOBIN A1C %  --  5.5   ANION GAP mmol/L 9 8   GLUCOSE mg/dL 138* 212*   CALCIUM mg/dL 11.2* 10.9*   AST U/L  --  19   ALT U/L  --  29   ALK PHOS U/L  --  102*   TOTAL PROTEIN g/dL  --  8.0   ALBUMIN g/dL 4.1 4.7   BILIRUBIN TOTAL mg/dL  --  0.52   EGFR mL/min/1.73m*2 59* 48*   PHOSPHORUS mg/dL 4.0  --    MAGNESIUM mg/dL 2.0  --       Edema (per nursing documentation):   Non-pitting generalized, +1 BLE  MIVF: None  I&O since admit: -2.7    Wt:     Weights (Current Encounter Only) (last 180 days)     Date/Time Weight    06/17/22 0000 61.9 kg (136 lb 7.4 oz)    06/16/22 0317 63.2 kg (139 lb 5.3 oz)        ANTHROPOMETRICS:  Ht Readings from Last 3 Encounters:   06/17/22 1.524 m (5')     Wt Readings from Last 10 Encounters:   06/17/22 61.9 kg (136 lb 7.4 oz)     Admit Weight: 63.2 kg (139 lb 5.3 oz) 6/16/2022  Admit BMI (kg/m2): 6/17 26.6-inaccurate with edema; presume BMI WNL  IBW/kg (Calculated) Female: 45.5 kg  Weight Category: Overweight-inaccurate with edema    ORAL/DENTAL STATUS:  Teeth: Decay, Intact       FOOD ALLERGIES:none    Hindu/CULTURAL REQUESTS:  None  Cultural Requests During Hospitalization: None  Spiritual Requests During Hospitalization: Muslim    NUTRITION FOCUSED PHYSICAL EXAM (NFPE):  Physical Exam-N/A       Subcutaneous Fat Loss       Muscle Wasting       Physical Findings        Statement Selected

## 2024-02-25 ENCOUNTER — HEALTH MAINTENANCE LETTER (OUTPATIENT)
Age: 49
End: 2024-02-25

## 2024-03-19 NOTE — LETTER
Date:March 23, 2020      Patient was self referred, no letter generated. Do not send.        [facility] Information       Patient has viewed results via [employer].

## (undated) RX ORDER — ALBUTEROL SULFATE 0.83 MG/ML
SOLUTION RESPIRATORY (INHALATION)
Status: DISPENSED
Start: 2020-03-10